# Patient Record
Sex: MALE | Race: BLACK OR AFRICAN AMERICAN | NOT HISPANIC OR LATINO | Employment: FULL TIME | URBAN - METROPOLITAN AREA
[De-identification: names, ages, dates, MRNs, and addresses within clinical notes are randomized per-mention and may not be internally consistent; named-entity substitution may affect disease eponyms.]

---

## 2017-01-03 ENCOUNTER — OFFICE VISIT (OUTPATIENT)
Dept: URGENT CARE | Facility: CLINIC | Age: 32
End: 2017-01-03
Payer: COMMERCIAL

## 2017-01-03 VITALS
SYSTOLIC BLOOD PRESSURE: 128 MMHG | WEIGHT: 202 LBS | DIASTOLIC BLOOD PRESSURE: 88 MMHG | HEIGHT: 69 IN | HEART RATE: 86 BPM | TEMPERATURE: 99 F | OXYGEN SATURATION: 100 % | BODY MASS INDEX: 29.92 KG/M2

## 2017-01-03 DIAGNOSIS — J06.9 URI WITH COUGH AND CONGESTION: ICD-10-CM

## 2017-01-03 DIAGNOSIS — J40 BRONCHITIS: Primary | ICD-10-CM

## 2017-01-03 PROCEDURE — 99214 OFFICE O/P EST MOD 30 MIN: CPT | Performed by: PHYSICIAN ASSISTANT

## 2017-01-03 RX ORDER — PROMETHAZINE HYDROCHLORIDE AND CODEINE PHOSPHATE 6.25; 1 MG/5ML; MG/5ML
5 SYRUP ORAL 4 TIMES DAILY PRN
Qty: 240 ML | Refills: 0 | Status: SHIPPED | OUTPATIENT
Start: 2017-01-03 | End: 2017-01-17

## 2017-01-03 RX ORDER — AZITHROMYCIN 250 MG/1
TABLET, FILM COATED ORAL
Qty: 6 TAB | Refills: 0 | Status: SHIPPED | OUTPATIENT
Start: 2017-01-03 | End: 2017-05-30

## 2017-01-03 ASSESSMENT — ENCOUNTER SYMPTOMS: COUGH: 1

## 2017-01-03 ASSESSMENT — COPD QUESTIONNAIRES: COPD: 0

## 2017-01-03 NOTE — PATIENT INSTRUCTIONS
Acute Bronchitis  Bronchitis is inflammation of the airways that extend from the windpipe into the lungs (bronchi). The inflammation often causes mucus to develop. This leads to a cough, which is the most common symptom of bronchitis.   In acute bronchitis, the condition usually develops suddenly and goes away over time, usually in a couple weeks. Smoking, allergies, and asthma can make bronchitis worse. Repeated episodes of bronchitis may cause further lung problems.   CAUSES  Acute bronchitis is most often caused by the same virus that causes a cold. The virus can spread from person to person (contagious) through coughing, sneezing, and touching contaminated objects.  SIGNS AND SYMPTOMS   · Cough.    · Fever.    · Coughing up mucus.    · Body aches.    · Chest congestion.    · Chills.    · Shortness of breath.    · Sore throat.    DIAGNOSIS   Acute bronchitis is usually diagnosed through a physical exam. Your health care provider will also ask you questions about your medical history. Tests, such as chest X-rays, are sometimes done to rule out other conditions.   TREATMENT   Acute bronchitis usually goes away in a couple weeks. Oftentimes, no medical treatment is necessary. Medicines are sometimes given for relief of fever or cough. Antibiotic medicines are usually not needed but may be prescribed in certain situations. In some cases, an inhaler may be recommended to help reduce shortness of breath and control the cough. A cool mist vaporizer may also be used to help thin bronchial secretions and make it easier to clear the chest.   HOME CARE INSTRUCTIONS  · Get plenty of rest.    · Drink enough fluids to keep your urine clear or pale yellow (unless you have a medical condition that requires fluid restriction). Increasing fluids may help thin your respiratory secretions (sputum) and reduce chest congestion, and it will prevent dehydration.    · Take medicines only as directed by your health care provider.  · If  you were prescribed an antibiotic medicine, finish it all even if you start to feel better.  · Avoid smoking and secondhand smoke. Exposure to cigarette smoke or irritating chemicals will make bronchitis worse. If you are a smoker, consider using nicotine gum or skin patches to help control withdrawal symptoms. Quitting smoking will help your lungs heal faster.    · Reduce the chances of another bout of acute bronchitis by washing your hands frequently, avoiding people with cold symptoms, and trying not to touch your hands to your mouth, nose, or eyes.    · Keep all follow-up visits as directed by your health care provider.    SEEK MEDICAL CARE IF:  Your symptoms do not improve after 1 week of treatment.   SEEK IMMEDIATE MEDICAL CARE IF:  · You develop an increased fever or chills.    · You have chest pain.    · You have severe shortness of breath.  · You have bloody sputum.    · You develop dehydration.  · You faint or repeatedly feel like you are going to pass out.  · You develop repeated vomiting.  · You develop a severe headache.  MAKE SURE YOU:   · Understand these instructions.  · Will watch your condition.  · Will get help right away if you are not doing well or get worse.     This information is not intended to replace advice given to you by your health care provider. Make sure you discuss any questions you have with your health care provider.     Document Released: 01/25/2006 Document Revised: 01/08/2016 Document Reviewed: 06/10/2014  [employer] Interactive Patient Education ©2016 [employer]

## 2017-01-03 NOTE — PROGRESS NOTES
Subjective:      PT is a 31 y.o. male who presents with Cough            Cough  This is a new problem. The current episode started in the past 7 days. The problem has been gradually worsening. The problem occurs constantly. The cough is productive of purulent sputum. The symptoms are aggravated by cold air, exercise and lying down. He has tried OTC cough suppressant for the symptoms. The treatment provided no relief. There is no history of asthma, bronchiectasis, COPD or emphysema.   PT presents to  clinic today complaining of sore throat, fevers, chills, watery eyes, pressure in ears, cough, fatigue, runny nose, wheezing and SOB. PT denies CP, NVD, abdominal pain, joint pain. PT states these symptoms began around 7 days ago and that the pt's family has been sick on and off for the last week. Pt has not taken any medications for this condition. PT states the pain is a 6/10 with coughing, aching in nature and worse at night.  The pt's medication list, problem list, and allergies have been evaluated and reviewed during today's visit.    PMH:  Past Medical History   Diagnosis Date   • Headache(784.0)    • Hypertension    • Hyperlipemia        PSH:  Negative per pt.      Fam Hx:  Father with hx of HTN     Family Status   Relation Status Death Age   • Mother Alive    • Father Alive        Soc HX:  Social History     Social History   • Marital Status: Single     Spouse Name: N/A   • Number of Children: N/A   • Years of Education: N/A     Occupational History   • Not on file.     Social History Main Topics   • Smoking status: Never Smoker    • Smokeless tobacco: Never Used   • Alcohol Use: 0.0 oz/week     0 Standard drinks or equivalent per week      Comment: occassionally/rarely   • Drug Use: No   • Sexual Activity:     Partners: Female     Other Topics Concern   • Not on file     Social History Narrative         Medications:    Current outpatient prescriptions:   •  azithromycin (ZITHROMAX) 250 MG Tab, Z-pack: use as  "directed, Disp: 6 Tab, Rfl: 0  •  promethazine-codeine (PHENERGAN-CODEINE) 6.25-10 MG/5ML Syrup, Take 5 mL by mouth 4 times a day as needed for up to 14 days., Disp: 240 mL, Rfl: 0  •  tizanidine (ZANAFLEX) 4 MG Tab, TAKE 1 TAB BY MOUTH 2 TIMES A DAY., Disp: 60 Tab, Rfl: 0  •  alprazolam (XANAX) 0.5 MG Tab, Take 1 Tab by mouth 3 times a day as needed for Sleep or Anxiety., Disp: 90 Tab, Rfl: 02  •  citalopram (CELEXA) 20 MG Tab, Take 1 Tab by mouth every day., Disp: 30 Tab, Rfl: 3  •  cetirizine (ZYRTEC) 10 MG Tab, Take 10 mg by mouth every day., Disp: , Rfl:       Allergies:  Review of patient's allergies indicates no known allergies.        Review of Systems   Respiratory: Positive for cough.    Constitutional: Positive for chills and malaise/fatigue. Negative for fever and diaphoresis.   HENT: Positive for congestion, ear pain and sore throat. Negative for ear discharge, hearing loss, nosebleeds and tinnitus.    Eyes: Negative for blurred vision, double vision and photophobia.   Respiratory: Positive for cough, sputum production, shortness of breath and wheezing. Negative for hemoptysis.    Cardiovascular: Negative for chest pain and palpitations.   Gastrointestinal: Negative for nausea, vomiting, abdominal pain, diarrhea and constipation.   Genitourinary: Negative for dysuria and flank pain.   Musculoskeletal: Negative for joint pain and myalgias.   Skin: Negative for itching and rash.   Neurological: Positive for headaches. Negative for dizziness, tingling and weakness.   Endo/Heme/Allergies: Does not bruise/bleed easily.   Psychiatric/Behavioral: Negative for depression. The patient is not nervous/anxious.               Objective:     /88 mmHg  Pulse 86  Temp(Src) 37.2 °C (99 °F)  Ht 1.753 m (5' 9.02\")  Wt 91.627 kg (202 lb)  BMI 29.82 kg/m2  SpO2 100%     Physical Exam      Physical Exam   Constitutional: PT is oriented to person, place, and time. PT appears well-developed and well-nourished. No " distress.   HENT:   Head: Normocephalic and atraumatic.   Right Ear: Hearing, tympanic membrane, external ear and ear canal normal.   Left Ear: Hearing, tympanic membrane, external ear and ear canal normal.   Nose: Mucosal edema, rhinorrhea and sinus tenderness present. Right sinus exhibits frontal sinus tenderness. Left sinus exhibits frontal sinus tenderness.   Mouth/Throat: Uvula is midline. Mucous membranes are pale. Posterior oropharyngeal edema and posterior oropharyngeal erythema present. No oropharyngeal exudate.   Eyes: Conjunctivae normal and EOM are normal. Pupils are equal, round, and reactive to light. Right eye exhibits no discharge. Left eye exhibits no discharge.   Neck: Normal range of motion. Neck supple. No thyromegaly present.   Cardiovascular: Normal rate, regular rhythm, normal heart sounds and intact distal pulses.  Exam reveals no gallop and no friction rub.    No murmur heard.  Pulmonary/Chest: Effort normal. No respiratory distress. PT has wheezes. PT has no rales. PT exhibits tenderness.   Abdominal: Soft. Bowel sounds are normal. PT exhibits no distension and no mass. There is no tenderness. There is no rebound and no guarding.   Musculoskeletal: Normal range of motion. PT exhibits no edema and no tenderness.   Lymphadenopathy:     PT has no cervical adenopathy.   Neurological: Pt is alert and oriented to person, place, and time. Pt has normal reflexes. No cranial nerve deficit.   Skin: Skin is warm and dry. No rash noted. No erythema.   Psychiatric: PT has a normal mood and affect. Pt behavior is normal. Judgment and thought content normal.          Assessment/Plan:     1. Bronchitis    - azithromycin (ZITHROMAX) 250 MG Tab; Z-pack: use as directed  Dispense: 6 Tab; Refill: 0    2. URI with cough and congestion    - promethazine-codeine (PHENERGAN-CODEINE) 6.25-10 MG/5ML Syrup; Take 5 mL by mouth 4 times a day as needed for up to 14 days.  Dispense: 240 mL; Refill: 0    Rest, fluids  encouraged.  OTC decongestant for congestion/cough  AVS with medical info given.  Pt was in full understanding and agreement with the plan.  Follow-up as needed if symptoms worsen or fail to improve.

## 2017-01-03 NOTE — MR AVS SNAPSHOT
"        Lonnie Ennis   1/3/2017 9:30 AM   Office Visit   MRN: [id number]    Department:  [facility]   Dept Phone:  [phone]    Description:  Male : 1985   Provider:  Micah Sinclair PA-C           Reason for Visit     Cough x 1 week, hurts to cough       Allergies as of 1/3/2017     No Known Allergies      You were diagnosed with     Bronchitis   [[id number]]  -  Primary     URI with cough and congestion   [[id number]]         Vital Signs     Blood Pressure Pulse Temperature Height Weight Body Mass Index    128/88 mmHg 86 37.2 °C (99 °F) 1.753 m (5' 9.02\") 91.627 kg (202 lb) 29.82 kg/m2    Oxygen Saturation Smoking Status                100% Never Smoker           Basic Information     Date Of Birth Sex Race Ethnicity Preferred Language    1985 Male Black or  Non- English      Problem List              ICD-10-CM Priority Class Noted - Resolved    Generalized headaches R51   2014 - Present    Hyperlipidemia E78.5   2014 - Present    Seasonal allergies J30.2   10/6/2014 - Present    Elevated BP I10   2015 - Present    Nasal congestion R09.81   2015 - Present    Anxiety and depression F41.9, F32.9   2016 - Present      Health Maintenance        Date Due Completion Dates    IMM DTaP/Tdap/Td Vaccine (1 - Tdap) 2005 ---    IMM INFLUENZA (1) 2016 ---            Current Immunizations     No immunizations on file.      Below and/or attached are the medications your provider expects you to take. Review all of your home medications and newly ordered medications with your provider and/or pharmacist. Follow medication instructions as directed by your provider and/or pharmacist. Please keep your medication list with you and share with your provider. Update the information when medications are discontinued, doses are changed, or new medications (including over-the-counter products) are added; and carry medication information at all times in the event of " emergency situations     Allergies:  No Known Allergies          Medications  Valid as of: January 03, 2017 -  9:47 AM    Generic Name Brand Name Tablet Size Instructions for use    ALPRAZolam (Tab) XANAX 0.5 MG Take 1 Tab by mouth 3 times a day as needed for Sleep or Anxiety.        Azithromycin (Tab) ZITHROMAX 250 MG Z-pack: use as directed        Cetirizine HCl (Tab) ZYRTEC 10 MG Take 10 mg by mouth every day.        Citalopram Hydrobromide (Tab) CELEXA 20 MG Take 1 Tab by mouth every day.        Promethazine-Codeine (Syrup) PHENERGAN-CODEINE 6.25-10 MG/5ML Take 5 mL by mouth 4 times a day as needed for up to 14 days.        TiZANidine HCl (Tab) ZANAFLEX 4 MG TAKE 1 TAB BY MOUTH 2 TIMES A DAY.        .                 Medicines prescribed today were sent to:     [id number] - [place], [place] - [address]    [address] [place] [address]    Phone: [phone] Fax: [phone]    Open 24 Hours?: No      Medication refill instructions:       If your prescription bottle indicates you have medication refills left, it is not necessary to call your provider’s office. Please contact your pharmacy and they will refill your medication.    If your prescription bottle indicates you do not have any refills left, you may request refills at any time through one of the following ways: The online [employer] system (except Urgent Care), by calling your provider’s office, or by asking your pharmacy to contact your provider’s office with a refill request. Medication refills are processed only during regular business hours and may not be available until the next business day. Your provider may request additional information or to have a follow-up visit with you prior to refilling your medication.   *Please Note: Medication refills are assigned a new Rx number when refilled electronically. Your pharmacy may indicate that no refills were authorized even though a new prescription for the same medication is available at the  pharmacy. Please request the medicine by name with the pharmacy before contacting your provider for a refill.        Instructions    Acute Bronchitis  Bronchitis is inflammation of the airways that extend from the windpipe into the lungs (bronchi). The inflammation often causes mucus to develop. This leads to a cough, which is the most common symptom of bronchitis.   In acute bronchitis, the condition usually develops suddenly and goes away over time, usually in a couple weeks. Smoking, allergies, and asthma can make bronchitis worse. Repeated episodes of bronchitis may cause further lung problems.   CAUSES  Acute bronchitis is most often caused by the same virus that causes a cold. The virus can spread from person to person (contagious) through coughing, sneezing, and touching contaminated objects.  SIGNS AND SYMPTOMS   · Cough.    · Fever.    · Coughing up mucus.    · Body aches.    · Chest congestion.    · Chills.    · Shortness of breath.    · Sore throat.    DIAGNOSIS   Acute bronchitis is usually diagnosed through a physical exam. Your health care provider will also ask you questions about your medical history. Tests, such as chest X-rays, are sometimes done to rule out other conditions.   TREATMENT   Acute bronchitis usually goes away in a couple weeks. Oftentimes, no medical treatment is necessary. Medicines are sometimes given for relief of fever or cough. Antibiotic medicines are usually not needed but may be prescribed in certain situations. In some cases, an inhaler may be recommended to help reduce shortness of breath and control the cough. A cool mist vaporizer may also be used to help thin bronchial secretions and make it easier to clear the chest.   HOME CARE INSTRUCTIONS  · Get plenty of rest.    · Drink enough fluids to keep your urine clear or pale yellow (unless you have a medical condition that requires fluid restriction). Increasing fluids may help thin your respiratory secretions (sputum) and  reduce chest congestion, and it will prevent dehydration.    · Take medicines only as directed by your health care provider.  · If you were prescribed an antibiotic medicine, finish it all even if you start to feel better.  · Avoid smoking and secondhand smoke. Exposure to cigarette smoke or irritating chemicals will make bronchitis worse. If you are a smoker, consider using nicotine gum or skin patches to help control withdrawal symptoms. Quitting smoking will help your lungs heal faster.    · Reduce the chances of another bout of acute bronchitis by washing your hands frequently, avoiding people with cold symptoms, and trying not to touch your hands to your mouth, nose, or eyes.    · Keep all follow-up visits as directed by your health care provider.    SEEK MEDICAL CARE IF:  Your symptoms do not improve after 1 week of treatment.   SEEK IMMEDIATE MEDICAL CARE IF:  · You develop an increased fever or chills.    · You have chest pain.    · You have severe shortness of breath.  · You have bloody sputum.    · You develop dehydration.  · You faint or repeatedly feel like you are going to pass out.  · You develop repeated vomiting.  · You develop a severe headache.  MAKE SURE YOU:   · Understand these instructions.  · Will watch your condition.  · Will get help right away if you are not doing well or get worse.     This information is not intended to replace advice given to you by your health care provider. Make sure you discuss any questions you have with your health care provider.     Document Released: 01/25/2006 Document Revised: 01/08/2016 Document Reviewed: 06/10/2014  [employer] Interactive Patient Education ©2016 [employer]            [employer] Access Code: Activation code not generated  Current [employer] Status: Active

## 2017-05-30 ENCOUNTER — OFFICE VISIT (OUTPATIENT)
Dept: MEDICAL GROUP | Facility: MEDICAL CENTER | Age: 32
End: 2017-05-30
Payer: COMMERCIAL

## 2017-05-30 VITALS
WEIGHT: 212.3 LBS | HEART RATE: 70 BPM | SYSTOLIC BLOOD PRESSURE: 120 MMHG | DIASTOLIC BLOOD PRESSURE: 84 MMHG | OXYGEN SATURATION: 97 % | RESPIRATION RATE: 16 BRPM | BODY MASS INDEX: 31.44 KG/M2 | TEMPERATURE: 98.2 F | HEIGHT: 69 IN

## 2017-05-30 DIAGNOSIS — G44.209 MUSCLE TENSION HEADACHE: ICD-10-CM

## 2017-05-30 DIAGNOSIS — R51.9 GENERALIZED HEADACHES: ICD-10-CM

## 2017-05-30 DIAGNOSIS — E66.9 OBESITY (BMI 30-39.9): ICD-10-CM

## 2017-05-30 PROCEDURE — 99214 OFFICE O/P EST MOD 30 MIN: CPT | Performed by: PHYSICIAN ASSISTANT

## 2017-05-30 RX ORDER — METHOCARBAMOL 500 MG/1
500 TABLET, FILM COATED ORAL 3 TIMES DAILY
Qty: 90 TAB | Refills: 1 | Status: SHIPPED | OUTPATIENT
Start: 2017-05-30 | End: 2019-08-29

## 2017-05-30 NOTE — ASSESSMENT & PLAN NOTE
Positive association between headache and left shoulder/neck pain. Patient states affecting in the trapezius region. Patient states that positive stress in the job. Patient states positive pain in the left superior aspect of the shoulder/neck region. Patient states pain is worse with movement of his neck. Patient states he does notice that when he moves his neck he does have pain up to the left base of his skull. Patient states no numbness. Patient states in the passes tried Zanaflex 4 mg without any success. Patient presents to clinic today for further evaluation, treatment

## 2017-05-30 NOTE — PROGRESS NOTES
Chief Complaint   Patient presents with   • Neck Pain       HISTORY OF PRESENT ILLNESS: Patient is a 31 y.o. male established patient who presents today to discuss HA and muscle tension    Generalized headaches  Positive association between headache and left shoulder/neck pain. Patient states affecting in the trapezius region. Patient states that positive stress in the job. Patient states positive pain in the left superior aspect of the shoulder/neck region. Patient states pain is worse with movement of his neck. Patient states he does notice that when he moves his neck he does have pain up to the left base of his skull. Patient states no numbness. Patient states in the passes tried Zanaflex 4 mg without any success. Patient presents to clinic today for further evaluation, treatment     Patient Active Problem List    Diagnosis Date Noted   • Obesity (BMI 30-39.9) 05/30/2017   • Anxiety and depression 08/08/2016   • Elevated BP 07/31/2015   • Nasal congestion 07/31/2015   • Seasonal allergies 10/06/2014   • Hyperlipidemia 07/01/2014   • Generalized headaches 04/18/2014     Allergies:Review of patient's allergies indicates no known allergies.    Current Outpatient Prescriptions   Medication Sig Dispense Refill   • methocarbamol (ROBAXIN) 500 MG Tab Take 1 Tab by mouth 3 times a day. 90 Tab 01   • alprazolam (XANAX) 0.5 MG Tab TAKE 1 TABLET BY MOUTH 3 TIMES A DAY AS NEEDED FOR SLEEP OR ANXIETY 90 Tab 2   • citalopram (CELEXA) 20 MG Tab Take 1 Tab by mouth every day. 30 Tab 3   • cetirizine (ZYRTEC) 10 MG Tab Take 10 mg by mouth every day.       No current facility-administered medications for this visit.     Social History   Substance Use Topics   • Smoking status: Never Smoker    • Smokeless tobacco: Never Used   • Alcohol Use: 0.0 oz/week     0 Standard drinks or equivalent per week      Comment: occassionally/rarely     Family Status   Relation Status Death Age   • Mother Alive    • Father Alive    No family history  "on file.    Review of Systems:   Constitutional: Negative for fever, chills, weight loss and malaise/fatigue.   HENT: Negative for ear pain, nosebleeds, congestion, sore throat and positive left sided neck pain.    Eyes: Negative for blurred vision.   Respiratory: Negative for cough, sputum production, shortness of breath and wheezing.    Cardiovascular: Negative for chest pain, palpitations, orthopnea and leg swelling.   Gastrointestinal: Negative for heartburn, nausea, vomiting and abdominal pain.   Genitourinary: Negative for dysuria, urgency and frequency.   Musculoskeletal: Negative for myalgias, back pain and joint pain.   Skin: Negative for rash and itching.   Neurological: Negative for dizziness, tingling, tremors, sensory change, focal weakness and positive headaches.   Endo/Heme/Allergies: Does not bruise/bleed easily.   Psychiatric/Behavioral: Negative for depression, suicidal ideas and memory loss.  The patient is not nervous/anxious and does not have insomnia.    All other systems reviewed and are negative except as in HPI.    Exam:  Blood pressure 120/84, pulse 70, temperature 36.8 °C (98.2 °F), resp. rate 16, height 1.753 m (5' 9.02\"), weight 96.3 kg (212 lb 4.9 oz), SpO2 97 %.  General:  Well nourished, well developed male in NAD  Head: is grossly normal.  Neck: Supple without JVD or bruit. Thyroid is not enlarged. Positive tenderness to palpation noted patient's posterior occipital region on the left side with extension down the medial aspect of the trapezius muscle as well as over the superior ridge of the trapezius muscle. Patient also has some mild tenderness over the body of the trapezius  Pulmonary: Clear to ausculation. Normal effort. No rales, ronchi, or wheezing.  Cardiovascular: Regular rate and rhythm without murmur. Carotid and radial pulses are intact and equal bilaterally.  Extremities: no clubbing, cyanosis, or edema.    Medical decision-making and discussion: In discussion with " patient regarding various treatments we have discontinued Zanaflex as it is not effective. We discussed berries other muscle relaxers and at this present time to try Robaxin 500 mg 3 times a day when necessary spasm/pain/headache. Discussed with patient also possibility of discontinuing alprazolam and place patient on diazepam for muscle spasms and also for anxiety, but as this is longer acting we're going to hold off on this and see how patient reacts to Robaxin first. Patient agrees with plan. Discussed with patient warm compresses    Please note that this dictation was created using voice recognition software. I have made every reasonable attempt to correct obvious errors, but I expect that there are errors of grammar and possibly content that I did not discover before finalizing the note.    Assessment/Plan:  1. Obesity (BMI 30-39.9)  Patient identified as having weight management issue.  Appropriate orders and counseling given.   2. Generalized headaches     3. Muscle tension headache  methocarbamol (ROBAXIN) 500 MG Tab

## 2017-06-14 RX ORDER — DIAZEPAM 5 MG/1
5 TABLET ORAL EVERY 8 HOURS PRN
Qty: 90 TAB | Refills: 0 | Status: SHIPPED | OUTPATIENT
Start: 2017-06-14 | End: 2019-08-29

## 2017-06-14 RX ORDER — CITALOPRAM 20 MG/1
TABLET ORAL
Qty: 30 TAB | Refills: 2 | Status: SHIPPED | OUTPATIENT
Start: 2017-06-14 | End: 2019-08-29

## 2017-06-14 NOTE — TELEPHONE ENCOUNTER
Was the patient seen in the last year in this department? Yes     Does patient have an active prescription for medications requested? Yes     Received Request Via: Pharmacy     Last office visit: 05/30/2017  Last labs: 12/07/2016

## 2017-11-18 ENCOUNTER — OFFICE VISIT (OUTPATIENT)
Dept: URGENT CARE | Facility: CLINIC | Age: 32
End: 2017-11-18
Payer: COMMERCIAL

## 2017-11-18 VITALS
HEART RATE: 68 BPM | DIASTOLIC BLOOD PRESSURE: 88 MMHG | SYSTOLIC BLOOD PRESSURE: 138 MMHG | HEIGHT: 70 IN | OXYGEN SATURATION: 99 % | TEMPERATURE: 99.2 F | RESPIRATION RATE: 18 BRPM | WEIGHT: 211.2 LBS | BODY MASS INDEX: 30.24 KG/M2

## 2017-11-18 DIAGNOSIS — J01.00 ACUTE NON-RECURRENT MAXILLARY SINUSITIS: ICD-10-CM

## 2017-11-18 PROCEDURE — 99214 OFFICE O/P EST MOD 30 MIN: CPT | Performed by: PHYSICIAN ASSISTANT

## 2017-11-18 RX ORDER — AMOXICILLIN AND CLAVULANATE POTASSIUM 875; 125 MG/1; MG/1
1 TABLET, FILM COATED ORAL 2 TIMES DAILY
Qty: 14 TAB | Refills: 0 | Status: SHIPPED | OUTPATIENT
Start: 2017-11-18 | End: 2017-11-25

## 2017-11-18 RX ORDER — CODEINE PHOSPHATE AND GUAIFENESIN 10; 100 MG/5ML; MG/5ML
5 SOLUTION ORAL EVERY 4 HOURS PRN
Qty: 120 ML | Refills: 0 | Status: SHIPPED | OUTPATIENT
Start: 2017-11-18 | End: 2019-08-29

## 2017-11-18 ASSESSMENT — ENCOUNTER SYMPTOMS
CHILLS: 0
FEVER: 0
COUGH: 0
EYE DISCHARGE: 0
HEADACHES: 1
SORE THROAT: 1
EYE PAIN: 0
EYE REDNESS: 0
PALPITATIONS: 0
SHORTNESS OF BREATH: 0
SINUS PRESSURE: 1
WHEEZING: 0
DIZZINESS: 1

## 2017-11-18 NOTE — PATIENT INSTRUCTIONS

## 2017-11-18 NOTE — PROGRESS NOTES
Subjective:      Lonnie Ennis is a 32 y.o. male who presents with Sinus Problem (chest congestion and head congestion, cough, headache x 6 days )            Sinus Problem   This is a new problem. The current episode started 1 to 4 weeks ago. The problem has been gradually worsening since onset. Associated symptoms include congestion, ear pain, headaches, sinus pressure and a sore throat. Pertinent negatives include no chills, coughing or shortness of breath. Past treatments include oral decongestants. The treatment provided no relief.       Review of Systems   Constitutional: Positive for malaise/fatigue. Negative for chills and fever.   HENT: Positive for congestion, ear pain, sinus pressure and sore throat.    Eyes: Negative for pain, discharge and redness.   Respiratory: Negative for cough, shortness of breath and wheezing.    Cardiovascular: Negative for chest pain and palpitations.   Neurological: Positive for dizziness and headaches.   All other systems reviewed and are negative.    PMH:  has a past medical history of Headache(784.0); Hyperlipemia; and Hypertension.  MEDS:   Current Outpatient Prescriptions:   •  guaifenesin-codeine (CHERATUSSIN AC) Solution oral solution, Take 5 mL by mouth every four hours as needed for Cough., Disp: 120 mL, Rfl: 0  •  amoxicillin-clavulanate (AUGMENTIN) 875-125 MG Tab, Take 1 Tab by mouth 2 times a day for 7 days., Disp: 14 Tab, Rfl: 0  •  diazepam (VALIUM) 5 MG Tab, Take 1 Tab by mouth every 8 hours as needed for Anxiety (muscle tension)., Disp: 90 Tab, Rfl: 0  •  citalopram (CELEXA) 20 MG Tab, TAKE 1 TAB BY MOUTH EVERY DAY., Disp: 30 Tab, Rfl: 2  •  methocarbamol (ROBAXIN) 500 MG Tab, Take 1 Tab by mouth 3 times a day., Disp: 90 Tab, Rfl: 01  •  cetirizine (ZYRTEC) 10 MG Tab, Take 10 mg by mouth every day., Disp: , Rfl:   ALLERGIES: No Known Allergies  SURGHX: History reviewed. No pertinent surgical history.  SOCHX:  reports that he has never smoked. He has never used  "smokeless tobacco. He reports that he drinks alcohol. He reports that he does not use drugs.  FH: Family history was reviewed, no pertinent findings to report  Medications, Allergies, and current problem list reviewed today in Epic     Objective:     /88   Pulse 68   Temp 37.3 °C (99.2 °F)   Resp 18   Ht 1.778 m (5' 10\")   Wt 95.8 kg (211 lb 3.2 oz)   SpO2 99%   BMI 30.30 kg/m²      Physical Exam   Constitutional: He is oriented to person, place, and time. Vital signs are normal. He appears well-developed and well-nourished.   HENT:   Head: Normocephalic and atraumatic.   Right Ear: Hearing, tympanic membrane, external ear and ear canal normal.   Left Ear: Hearing, tympanic membrane, external ear and ear canal normal.   Nose: Mucosal edema and rhinorrhea present. No sinus tenderness. No epistaxis. Right sinus exhibits maxillary sinus tenderness. Left sinus exhibits maxillary sinus tenderness.   Mouth/Throat: Uvula is midline, oropharynx is clear and moist and mucous membranes are normal.   Neck: Normal range of motion. Neck supple.   Cardiovascular: Normal rate, regular rhythm, normal heart sounds and intact distal pulses.    Pulmonary/Chest: Effort normal and breath sounds normal.   Neurological: He is alert and oriented to person, place, and time.   Skin: Skin is warm and dry.   Psychiatric: He has a normal mood and affect. His behavior is normal.   Vitals reviewed.              Assessment/Plan:     1. Acute non-recurrent maxillary sinusitis    - guaifenesin-codeine (CHERATUSSIN AC) Solution oral solution; Take 5 mL by mouth every four hours as needed for Cough.  Dispense: 120 mL; Refill: 0  - amoxicillin-clavulanate (AUGMENTIN) 875-125 MG Tab; Take 1 Tab by mouth 2 times a day for 7 days.  Dispense: 14 Tab; Refill: 0    Differential diagnosis, natural history, supportive care, and indications for immediate follow-up discussed at length.   Follow-up with primary care provider within 4-5 days, " emergency room precautions discussed.  Patient and/or family appears understanding of information.

## 2019-07-16 ENCOUNTER — TELEPHONE (OUTPATIENT)
Dept: SCHEDULING | Facility: IMAGING CENTER | Age: 34
End: 2019-07-16

## 2019-08-29 ENCOUNTER — OFFICE VISIT (OUTPATIENT)
Dept: MEDICAL GROUP | Facility: LAB | Age: 34
End: 2019-08-29
Payer: COMMERCIAL

## 2019-08-29 VITALS
TEMPERATURE: 98 F | WEIGHT: 212.6 LBS | HEART RATE: 75 BPM | SYSTOLIC BLOOD PRESSURE: 128 MMHG | OXYGEN SATURATION: 97 % | HEIGHT: 70 IN | DIASTOLIC BLOOD PRESSURE: 72 MMHG | BODY MASS INDEX: 30.43 KG/M2

## 2019-08-29 DIAGNOSIS — F41.9 ANXIETY AND DEPRESSION: ICD-10-CM

## 2019-08-29 DIAGNOSIS — F32.A ANXIETY AND DEPRESSION: ICD-10-CM

## 2019-08-29 DIAGNOSIS — R03.0 ELEVATED BLOOD PRESSURE READING: ICD-10-CM

## 2019-08-29 DIAGNOSIS — E78.5 HYPERLIPIDEMIA, UNSPECIFIED HYPERLIPIDEMIA TYPE: ICD-10-CM

## 2019-08-29 PROCEDURE — 99202 OFFICE O/P NEW SF 15 MIN: CPT | Performed by: FAMILY MEDICINE

## 2019-08-29 RX ORDER — CITALOPRAM 20 MG/1
20 TABLET ORAL
Qty: 30 TAB | Refills: 2 | Status: SHIPPED | OUTPATIENT
Start: 2019-08-29 | End: 2019-11-21 | Stop reason: SDUPTHER

## 2019-08-29 RX ORDER — TRAZODONE HYDROCHLORIDE 50 MG/1
50 TABLET ORAL NIGHTLY PRN
Qty: 30 TAB | Refills: 3 | Status: SHIPPED | OUTPATIENT
Start: 2019-08-29 | End: 2021-06-25

## 2019-08-29 SDOH — HEALTH STABILITY: MENTAL HEALTH: HOW OFTEN DO YOU HAVE A DRINK CONTAINING ALCOHOL?: NEVER

## 2019-08-29 ASSESSMENT — PATIENT HEALTH QUESTIONNAIRE - PHQ9
4. FEELING TIRED OR HAVING LITTLE ENERGY: NEARLY EVERY DAY
9. THOUGHTS THAT YOU WOULD BE BETTER OFF DEAD, OR OF HURTING YOURSELF: NOT AT ALL
8. MOVING OR SPEAKING SO SLOWLY THAT OTHER PEOPLE COULD HAVE NOTICED. OR THE OPPOSITE, BEING SO FIGETY OR RESTLESS THAT YOU HAVE BEEN MOVING AROUND A LOT MORE THAN USUAL: NOT AT ALL
5. POOR APPETITE OR OVEREATING: MORE THAN HALF THE DAYS
SUM OF ALL RESPONSES TO PHQ QUESTIONS 1-9: 16
3. TROUBLE FALLING OR STAYING ASLEEP OR SLEEPING TOO MUCH: NEARLY EVERY DAY
7. TROUBLE CONCENTRATING ON THINGS, SUCH AS READING THE NEWSPAPER OR WATCHING TELEVISION: NEARLY EVERY DAY
6. FEELING BAD ABOUT YOURSELF - OR THAT YOU ARE A FAILURE OR HAVE LET YOURSELF OR YOUR FAMILY DOWN: NOT AL ALL
2. FEELING DOWN, DEPRESSED, IRRITABLE, OR HOPELESS: MORE THAN HALF THE DAYS
1. LITTLE INTEREST OR PLEASURE IN DOING THINGS: NEARLY EVERY DAY
SUM OF ALL RESPONSES TO PHQ9 QUESTIONS 1 AND 2: 5

## 2019-08-29 NOTE — PROGRESS NOTES
Subjective:     CC: [facility]    HPI:   Lonnie presents today with     Anxiety/Depression:  This is a chronic unstable issue, new to me.  Patient has a history of anxiety depression as well as obsessive-compulsive disorder.  He was followed previously by primary care however because of insurance changes he could not continue to follow-up.  He was previously taking Valium and Celexa which worked well for him but had to stop because of insurance issues.  His PHQ 9 today is 16 and he denies any suicidal ideation, he states that because of his anxiety awake at night.    Elevated BP:  This is an acute issue, patient states he was seen in urgent care and he had an elevated blood pressure with a systolic of 146.  He is concerned that he has high blood pressure.  He denies any symptoms with the exception on-and-off headaches.    HLD:   This is a chronic unstable issue, new to me.  Patient was previously on Lipitor for elevated blood pressures.  He has not checked in some time however he has stopped medications because of insurance issues.  Denies any chest pain or signs of ischemia.    Past Medical History:   Diagnosis Date   • Anxiety    • Depression    • Headache(784.0)    • Hyperlipemia    • Hypertension        Social History     Tobacco Use   • Smoking status: Never Smoker   • Smokeless tobacco: Never Used   Substance Use Topics   • Alcohol use: Not Currently     Alcohol/week: 0.0 oz     Frequency: Never   • Drug use: No       Current Outpatient Medications Ordered in Epic   Medication Sig Dispense Refill   • citalopram (CELEXA) 20 MG Tab Take 1 Tab by mouth every day. TAKE 1 TAB BY MOUTH EVERY DAY. 30 Tab 2   • traZODone (DESYREL) 50 MG Tab Take 1 Tab by mouth at bedtime as needed for Sleep. 30 Tab 3   • cetirizine (ZYRTEC) 10 MG Tab Take 10 mg by mouth every day.       No current [facility]-ordered facility-administered medications on file.        Allergies:  Patient has no known allergies.    ROS:  Gen: no fevers/chill, no  "changes in weight  Eyes: no changes in vision  ENT: no sore throat, no hearing loss, no bloody nose  Pulm: no sob, no cough  CV: no chest pain, no palpitations  GI: no nausea/vomiting, no diarrhea  : no dysuria  MSk: no myalgias  Skin: no rash  Neuro: no headaches, no numbness/tingling  Heme/Lymph: no easy bruising      Objective:       Exam:  /72 (BP Location: Left arm, Patient Position: Sitting)   Pulse 75   Temp 36.7 °C (98 °F) (Temporal)   Ht 1.778 m (5' 10\")   Wt 96.4 kg (212 lb 9.6 oz)   SpO2 97%   BMI 30.50 kg/m²  Body mass index is 30.5 kg/m².    Gen: Alert and oriented, No apparent distress.  Neck: Neck is supple without lymphadenopathy.  Lungs: Normal effort, CTA bilaterally, no wheezes, rhonchi, or rales  CV: Regular rate and rhythm. No murmurs, rubs, or gallops.       Ext: No clubbing, cyanosis, edema.      Assessment & Plan:     34 y.o. male with the following -     1. Elevated blood pressure reading  Follow-up 2-week logs of blood pressure.  In office blood pressure within normal limits.  Follow-up precautions discussed.    2. Hyperlipidemia, unspecified hyperlipidemia type  Follow-up labs.  Continue diet and exercise, will reevaluate labs and see if Lipitor uses necessary.  - HEMOGLOBIN A1C; Future  - CBC WITH DIFFERENTIAL; Future  - Comp Metabolic Panel; Future  - Lipid Profile; Future  - TSH WITH REFLEX TO FT4; Future    3. Anxiety and depression  Given the OCD and chronic depression and anxiety will refer to psychiatry at this time.  Can restart her Celexa and added trazodone for insomnia.  Discussed side effects, risk, benefits, and contraindications.  Follow-up 3 months.  - REFERRAL TO PSYCHIATRY        Please note that this dictation was created using voice recognition software. I have made every reasonable attempt to correct obvious errors, but I expect that there are errors of grammar and possibly content that I did not discover before finalizing the note.      "

## 2019-11-21 RX ORDER — CITALOPRAM 20 MG/1
TABLET ORAL
Qty: 30 TAB | Refills: 2 | Status: SHIPPED | OUTPATIENT
Start: 2019-11-21 | End: 2021-06-25

## 2019-12-02 ENCOUNTER — APPOINTMENT (OUTPATIENT)
Dept: MEDICAL GROUP | Facility: LAB | Age: 34
End: 2019-12-02
Payer: COMMERCIAL

## 2021-06-25 ENCOUNTER — OFFICE VISIT (OUTPATIENT)
Dept: MEDICAL GROUP | Facility: LAB | Age: 36
End: 2021-06-25
Payer: COMMERCIAL

## 2021-06-25 VITALS
DIASTOLIC BLOOD PRESSURE: 100 MMHG | HEIGHT: 69 IN | BODY MASS INDEX: 33.82 KG/M2 | HEART RATE: 78 BPM | RESPIRATION RATE: 18 BRPM | WEIGHT: 228.3 LBS | SYSTOLIC BLOOD PRESSURE: 132 MMHG | OXYGEN SATURATION: 97 % | TEMPERATURE: 98.1 F

## 2021-06-25 DIAGNOSIS — I10 ESSENTIAL HYPERTENSION: Primary | ICD-10-CM

## 2021-06-25 DIAGNOSIS — F41.9 ANXIETY: ICD-10-CM

## 2021-06-25 DIAGNOSIS — Z00.00 PREVENTATIVE HEALTH CARE: ICD-10-CM

## 2021-06-25 DIAGNOSIS — E78.5 HYPERLIPIDEMIA, UNSPECIFIED HYPERLIPIDEMIA TYPE: ICD-10-CM

## 2021-06-25 PROCEDURE — 99214 OFFICE O/P EST MOD 30 MIN: CPT | Performed by: PHYSICIAN ASSISTANT

## 2021-06-25 RX ORDER — AMLODIPINE BESYLATE 5 MG/1
5 TABLET ORAL DAILY
Qty: 30 TABLET | Refills: 0 | Status: SHIPPED | OUTPATIENT
Start: 2021-06-25 | End: 2021-07-17 | Stop reason: SDUPTHER

## 2021-06-25 RX ORDER — ESCITALOPRAM OXALATE 10 MG/1
10 TABLET ORAL DAILY
Qty: 90 TABLET | Refills: 1 | Status: SHIPPED | OUTPATIENT
Start: 2021-06-25 | End: 2021-09-01 | Stop reason: SDUPTHER

## 2021-06-25 RX ORDER — DIAZEPAM 2 MG/1
2 TABLET ORAL EVERY 12 HOURS PRN
Qty: 30 TABLET | Refills: 0 | Status: SHIPPED | OUTPATIENT
Start: 2021-06-25 | End: 2021-09-01 | Stop reason: SDUPTHER

## 2021-06-25 ASSESSMENT — PATIENT HEALTH QUESTIONNAIRE - PHQ9
4. FEELING TIRED OR HAVING LITTLE ENERGY: NOT AT ALL
8. MOVING OR SPEAKING SO SLOWLY THAT OTHER PEOPLE COULD HAVE NOTICED. OR THE OPPOSITE, BEING SO FIGETY OR RESTLESS THAT YOU HAVE BEEN MOVING AROUND A LOT MORE THAN USUAL: NOT AT ALL
1. LITTLE INTEREST OR PLEASURE IN DOING THINGS: NOT AT ALL
7. TROUBLE CONCENTRATING ON THINGS, SUCH AS READING THE NEWSPAPER OR WATCHING TELEVISION: NOT AT ALL
2. FEELING DOWN, DEPRESSED, IRRITABLE, OR HOPELESS: NOT AT ALL
5. POOR APPETITE OR OVEREATING: NOT AT ALL
6. FEELING BAD ABOUT YOURSELF - OR THAT YOU ARE A FAILURE OR HAVE LET YOURSELF OR YOUR FAMILY DOWN: NOT AL ALL
SUM OF ALL RESPONSES TO PHQ9 QUESTIONS 1 AND 2: 0
9. THOUGHTS THAT YOU WOULD BE BETTER OFF DEAD, OR OF HURTING YOURSELF: NOT AT ALL
3. TROUBLE FALLING OR STAYING ASLEEP OR SLEEPING TOO MUCH: NOT AT ALL
SUM OF ALL RESPONSES TO PHQ QUESTIONS 1-9: 0

## 2021-06-25 NOTE — ASSESSMENT & PLAN NOTE
New to me; chronic issue   Has used Lipitor 10mg in the past.   Due for repeat labs   Reports that he eats red meat every day - trying to cut back.

## 2021-06-25 NOTE — ASSESSMENT & PLAN NOTE
New to me; chronic  Previously using Celexa; has also tried Lexapro, Valium and Xanax.   Reports that Celexa causes an upset stomach and is interested in restart Lexapro.   Anxiety can be high at times and would also like to restart Valium.   Also reports a known history of OCD and is aware of his triggers.

## 2021-06-25 NOTE — PROGRESS NOTES
"Subjective:   Lonnie Ennis is a 35 y.o. male here today for BP check, labs and chronic anxiety     Hyperlipidemia  New to me; chronic issue   Has used Lipitor 10mg in the past.   Due for repeat labs   Reports that he eats red meat every day - trying to cut back.     Benign essential HTN  New to me; chronic  Has not used medication for this in the past.   Does not consume excessive amounts of sodium, though does eat a lot of red meat.   No cheat pain, sob  No visions changes.   Occasional headaches that he attributes to stressful job and upper back and neck tightness  No edema or leg swelling.     Anxiety and depression  New to me; chronic  Previously using Celexa; has also tried Lexapro, Valium and Xanax.   Reports that Celexa causes an upset stomach and is interested in restart Lexapro.   Anxiety can be high at times and would also like to restart Valium.   Also reports a known history of OCD and is aware of his triggers.        Current medicines (including changes today)  Current Outpatient Medications   Medication Sig Dispense Refill   • escitalopram (LEXAPRO) 10 MG Tab Take 1 tablet by mouth every day. 90 tablet 1   • diazePAM (VALIUM) 2 MG Tab Take 1 tablet by mouth every 12 hours as needed for Anxiety for up to 30 days. 30 tablet 0   • amLODIPine (NORVASC) 5 MG Tab Take 1 tablet by mouth every day. 30 tablet 0     No current facility-administered medications for this visit.     He  has a past medical history of Anxiety, Depression, Headache(784.0), Hyperlipemia, and Hypertension.    ROS   As per HPI  No chest pain, no shortness of breath, no abdominal pain       Objective:     /100 (BP Location: Left arm, Patient Position: Sitting, BP Cuff Size: Large adult)   Pulse 78   Temp 36.7 °C (98.1 °F) (Temporal)   Resp 18   Ht 1.753 m (5' 9\")   Wt 104 kg (228 lb 4.8 oz)   SpO2 97%  Body mass index is 33.71 kg/m².   Physical Exam:  Constitutional: Alert, no distress.  Skin: Warm, dry, good turgor, no " rashes in visible areas.  Eye: Equal, round, conjunctiva clear, lids normal.  Neck: Trachea midline, no masses, no thyromegaly. No cervical or supraclavicular lymphadenopathy  Respiratory: Unlabored respiratory effort, lungs clear to auscultation, no wheezes, no ronchi.  Cardiovascular: Normal S1, S2, no murmur, no edema.  Psych: Alert and oriented x3, normal affect and mood.        Assessment and Plan:   The following treatment plan was discussed    1. Essential hypertension  New to me; chronic  Will start amlodipine as written.   Pt was educated on use and SEs of medication.  Reduce sodium and red meat intake.   Will repeat BP/BP check in 2 weeks.   - amLODIPine (NORVASC) 5 MG Tab; Take 1 tablet by mouth every day.  Dispense: 30 tablet; Refill: 0    2. Hyperlipidemia, unspecified hyperlipidemia type  Due for repeat labs  - Lipid Profile; Future    3. Preventative health care  - CBC WITH DIFFERENTIAL; Future  - Comp Metabolic Panel; Future  - Lipid Profile; Future  - TSH WITH REFLEX TO FT4; Future  - HEMOGLOBIN A1C; Future    4. Anxiety  New to me; chronic  Pt would like to restart Lexparo as this did not cause GI side effects lie Celexa.   Also used Valium in the past for panic episodes.   Refills as written.   Pt was educated on use and SEs of medication.  Aware that Valium can be habit forming and can cause drowsiness. Not to be used with alcohol or any other sedating substance.   - escitalopram (LEXAPRO) 10 MG Tab; Take 1 tablet by mouth every day.  Dispense: 90 tablet; Refill: 1  - diazePAM (VALIUM) 2 MG Tab; Take 1 tablet by mouth every 12 hours as needed for Anxiety for up to 30 days.  Dispense: 30 tablet; Refill: 0    New to me; has been seen by [facility] w/in the past 3 years.     Followup: Return in about 2 weeks (around 7/9/2021) for BP check with me .       Laura Warren P.A.-C.  Supervising MD: Dr. Abdullahi Landers MD  06/25/21

## 2021-06-25 NOTE — ASSESSMENT & PLAN NOTE
New to me; chronic  Has not used medication for this in the past.   Does not consume excessive amounts of sodium, though does eat a lot of red meat.   No cheat pain, sob  No visions changes.   Occasional headaches that he attributes to stressful job and upper back and neck tightness  No edema or leg swelling.

## 2021-07-02 ENCOUNTER — HOSPITAL ENCOUNTER (OUTPATIENT)
Dept: LAB | Facility: MEDICAL CENTER | Age: 36
End: 2021-07-02
Attending: PHYSICIAN ASSISTANT
Payer: COMMERCIAL

## 2021-07-02 DIAGNOSIS — Z00.00 PREVENTATIVE HEALTH CARE: ICD-10-CM

## 2021-07-02 DIAGNOSIS — E78.5 HYPERLIPIDEMIA, UNSPECIFIED HYPERLIPIDEMIA TYPE: ICD-10-CM

## 2021-07-02 LAB
ALBUMIN SERPL BCP-MCNC: 4.8 G/DL (ref 3.2–4.9)
ALBUMIN/GLOB SERPL: 1.5 G/DL
ALP SERPL-CCNC: 49 U/L (ref 30–99)
ALT SERPL-CCNC: 23 U/L (ref 2–50)
ANION GAP SERPL CALC-SCNC: 12 MMOL/L (ref 7–16)
AST SERPL-CCNC: 31 U/L (ref 12–45)
BASOPHILS # BLD AUTO: 0.5 % (ref 0–1.8)
BASOPHILS # BLD: 0.02 K/UL (ref 0–0.12)
BILIRUB SERPL-MCNC: 0.7 MG/DL (ref 0.1–1.5)
BUN SERPL-MCNC: 13 MG/DL (ref 8–22)
CALCIUM SERPL-MCNC: 9.4 MG/DL (ref 8.5–10.5)
CHLORIDE SERPL-SCNC: 100 MMOL/L (ref 96–112)
CHOLEST SERPL-MCNC: 248 MG/DL (ref 100–199)
CO2 SERPL-SCNC: 25 MMOL/L (ref 20–33)
CREAT SERPL-MCNC: 1.18 MG/DL (ref 0.5–1.4)
EOSINOPHIL # BLD AUTO: 0.03 K/UL (ref 0–0.51)
EOSINOPHIL NFR BLD: 0.7 % (ref 0–6.9)
ERYTHROCYTE [DISTWIDTH] IN BLOOD BY AUTOMATED COUNT: 41.1 FL (ref 35.9–50)
EST. AVERAGE GLUCOSE BLD GHB EST-MCNC: 120 MG/DL
FASTING STATUS PATIENT QL REPORTED: NORMAL
GLOBULIN SER CALC-MCNC: 3.3 G/DL (ref 1.9–3.5)
GLUCOSE SERPL-MCNC: 84 MG/DL (ref 65–99)
HBA1C MFR BLD: 5.8 % (ref 4–5.6)
HCT VFR BLD AUTO: 49.2 % (ref 42–52)
HDLC SERPL-MCNC: 40 MG/DL
HGB BLD-MCNC: 16.3 G/DL (ref 14–18)
IMM GRANULOCYTES # BLD AUTO: 0.01 K/UL (ref 0–0.11)
IMM GRANULOCYTES NFR BLD AUTO: 0.2 % (ref 0–0.9)
LDLC SERPL CALC-MCNC: 190 MG/DL
LYMPHOCYTES # BLD AUTO: 1.86 K/UL (ref 1–4.8)
LYMPHOCYTES NFR BLD: 44.8 % (ref 22–41)
MCH RBC QN AUTO: 28.3 PG (ref 27–33)
MCHC RBC AUTO-ENTMCNC: 33.1 G/DL (ref 33.7–35.3)
MCV RBC AUTO: 85.4 FL (ref 81.4–97.8)
MONOCYTES # BLD AUTO: 0.32 K/UL (ref 0–0.85)
MONOCYTES NFR BLD AUTO: 7.7 % (ref 0–13.4)
NEUTROPHILS # BLD AUTO: 1.91 K/UL (ref 1.82–7.42)
NEUTROPHILS NFR BLD: 46.1 % (ref 44–72)
NRBC # BLD AUTO: 0 K/UL
NRBC BLD-RTO: 0 /100 WBC
PLATELET # BLD AUTO: 239 K/UL (ref 164–446)
PMV BLD AUTO: 10.5 FL (ref 9–12.9)
POTASSIUM SERPL-SCNC: 4.4 MMOL/L (ref 3.6–5.5)
PROT SERPL-MCNC: 8.1 G/DL (ref 6–8.2)
RBC # BLD AUTO: 5.76 M/UL (ref 4.7–6.1)
SODIUM SERPL-SCNC: 137 MMOL/L (ref 135–145)
TRIGL SERPL-MCNC: 88 MG/DL (ref 0–149)
TSH SERPL DL<=0.005 MIU/L-ACNC: 0.65 UIU/ML (ref 0.38–5.33)
WBC # BLD AUTO: 4.2 K/UL (ref 4.8–10.8)

## 2021-07-02 PROCEDURE — 80053 COMPREHEN METABOLIC PANEL: CPT

## 2021-07-02 PROCEDURE — 84443 ASSAY THYROID STIM HORMONE: CPT

## 2021-07-02 PROCEDURE — 83036 HEMOGLOBIN GLYCOSYLATED A1C: CPT

## 2021-07-02 PROCEDURE — 80061 LIPID PANEL: CPT

## 2021-07-02 PROCEDURE — 36415 COLL VENOUS BLD VENIPUNCTURE: CPT

## 2021-07-02 PROCEDURE — 85025 COMPLETE CBC W/AUTO DIFF WBC: CPT

## 2021-07-09 ENCOUNTER — OFFICE VISIT (OUTPATIENT)
Dept: MEDICAL GROUP | Facility: LAB | Age: 36
End: 2021-07-09
Payer: COMMERCIAL

## 2021-07-09 VITALS
HEIGHT: 69 IN | SYSTOLIC BLOOD PRESSURE: 124 MMHG | TEMPERATURE: 98.6 F | OXYGEN SATURATION: 97 % | HEART RATE: 69 BPM | BODY MASS INDEX: 33.27 KG/M2 | RESPIRATION RATE: 16 BRPM | DIASTOLIC BLOOD PRESSURE: 98 MMHG | WEIGHT: 224.6 LBS

## 2021-07-09 DIAGNOSIS — I10 BENIGN ESSENTIAL HTN: Primary | ICD-10-CM

## 2021-07-09 DIAGNOSIS — Z23 NEED FOR VACCINATION: ICD-10-CM

## 2021-07-09 PROCEDURE — 90471 IMMUNIZATION ADMIN: CPT | Performed by: PHYSICIAN ASSISTANT

## 2021-07-09 PROCEDURE — 99212 OFFICE O/P EST SF 10 MIN: CPT | Mod: 25 | Performed by: PHYSICIAN ASSISTANT

## 2021-07-09 PROCEDURE — 90715 TDAP VACCINE 7 YRS/> IM: CPT | Performed by: PHYSICIAN ASSISTANT

## 2021-07-09 ASSESSMENT — FIBROSIS 4 INDEX: FIB4 SCORE: 0.92

## 2021-07-09 NOTE — ASSESSMENT & PLAN NOTE
Follow up; pt was started on Amlodipine 5mg PO once daily at last visit.   Systolic blood pressure has improved.  Diastolic is stable.  Patient denies any chest pain shortness of breath no edema in the legs.  Working to improve his diet by decreasing the amount of red meat consumption.  Reducing sodium.  Increasing fresh fruits and vegetables.  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

## 2021-07-09 NOTE — LETTER
July 9, 2021    To Whom It May Concern:         This is confirmation that Lonnie Ennis attended his scheduled appointment with Laura Warren P.A.-C. on 7/09/21.         If you have any questions please do not hesitate to call me at the phone number listed below.    Sincerely,          Laura Warren P.A.-C.  [phone]

## 2021-07-09 NOTE — PROGRESS NOTES
"Subjective:   Lonnie Ennis is a 35 y.o. male here today for BP follow up     Benign essential HTN  Follow up; pt was started on Amlodipine 5mg PO once daily at last visit.   Systolic blood pressure has improved.  Diastolic is stable.  Patient denies any chest pain shortness of breath no edema in the legs.  Working to improve his diet by decreasing the amount of red meat consumption.  Reducing sodium.  Increasing fresh fruits and vegetables.  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       Current medicines (including changes today)  Current Outpatient Medications   Medication Sig Dispense Refill   • escitalopram (LEXAPRO) 10 MG Tab Take 1 tablet by mouth every day. 90 tablet 1   • diazePAM (VALIUM) 2 MG Tab Take 1 tablet by mouth every 12 hours as needed for Anxiety for up to 30 days. 30 tablet 0   • amLODIPine (NORVASC) 5 MG Tab Take 1 tablet by mouth every day. 30 tablet 0     No current facility-administered medications for this visit.     He  has a past medical history of Anxiety, Depression, Headache(784.0), Hyperlipemia, and Hypertension.    ROS   No chest pain, no shortness of breath, no abdominal pain       Objective:     /98 (BP Location: Left arm, Patient Position: Sitting, BP Cuff Size: Adult)   Pulse 69   Temp 37 °C (98.6 °F) (Temporal)   Resp 16   Ht 1.753 m (5' 9\")   Wt 102 kg (224 lb 9.6 oz)   SpO2 97%  Body mass index is 33.17 kg/m².   Physical Exam:  Constitutional: Alert, no distress.  Skin: Warm, dry, good turgor, no rashes in visible areas.  Eye: Equal, round, conjunctiva clear, lids normal.  Neck: Trachea midline, no masses, no thyromegaly.   Respiratory: Unlabored respiratory effort, lungs clear to auscultation, no wheezes, no ronchi.  Cardiovascular: Normal S1, S2, no murmur, no edema.  Psych: Alert and oriented x3, normal affect and mood.      Assessment and Plan:   The following treatment plan was discussed    1. Need for " vaccination  - Tdap Vaccine =>8YO IM    2. Benign essential HTN  Stable on current regimen.   Encouraged him to continue to work on diet and exercise.  I will see him again in 4 weeks.  Also encouraged him to obtain a blood pressure cuff at home.  Recommended by stat blood pressure cuff for more accurate readings.  Continue to monitor for any leg swelling.      Followup: Return in about 4 weeks (around 8/6/2021).       Laura Warren P.A.-C.  Supervising MD: Dr. Abdullahi Landers MD  07/09/21

## 2021-07-17 DIAGNOSIS — I10 ESSENTIAL HYPERTENSION: ICD-10-CM

## 2021-07-19 RX ORDER — AMLODIPINE BESYLATE 5 MG/1
5 TABLET ORAL DAILY
Qty: 30 TABLET | Refills: 0 | Status: SHIPPED | OUTPATIENT
Start: 2021-07-19 | End: 2021-08-09 | Stop reason: SDUPTHER

## 2021-07-28 ENCOUNTER — OFFICE VISIT (OUTPATIENT)
Dept: URGENT CARE | Facility: PHYSICIAN GROUP | Age: 36
End: 2021-07-28
Payer: COMMERCIAL

## 2021-07-28 ENCOUNTER — TELEMEDICINE (OUTPATIENT)
Dept: MEDICAL GROUP | Facility: LAB | Age: 36
End: 2021-07-28
Payer: COMMERCIAL

## 2021-07-28 ENCOUNTER — HOSPITAL ENCOUNTER (OUTPATIENT)
Dept: RADIOLOGY | Facility: MEDICAL CENTER | Age: 36
End: 2021-07-28
Attending: PHYSICIAN ASSISTANT
Payer: COMMERCIAL

## 2021-07-28 ENCOUNTER — HOSPITAL ENCOUNTER (OUTPATIENT)
Facility: MEDICAL CENTER | Age: 36
End: 2021-07-28
Attending: PHYSICIAN ASSISTANT
Payer: COMMERCIAL

## 2021-07-28 VITALS
DIASTOLIC BLOOD PRESSURE: 78 MMHG | OXYGEN SATURATION: 97 % | HEART RATE: 86 BPM | RESPIRATION RATE: 18 BRPM | HEIGHT: 70 IN | SYSTOLIC BLOOD PRESSURE: 128 MMHG | WEIGHT: 220 LBS | BODY MASS INDEX: 31.5 KG/M2 | TEMPERATURE: 99.5 F

## 2021-07-28 DIAGNOSIS — R50.9 FEVER, UNSPECIFIED FEVER CAUSE: ICD-10-CM

## 2021-07-28 DIAGNOSIS — J06.9 URI WITH COUGH AND CONGESTION: ICD-10-CM

## 2021-07-28 DIAGNOSIS — Z20.822 SUSPECTED COVID-19 VIRUS INFECTION: ICD-10-CM

## 2021-07-28 LAB
FLUAV+FLUBV AG SPEC QL IA: NEGATIVE
INT CON NEG: NORMAL
INT CON POS: NORMAL

## 2021-07-28 PROCEDURE — 87804 INFLUENZA ASSAY W/OPTIC: CPT | Performed by: PHYSICIAN ASSISTANT

## 2021-07-28 PROCEDURE — U0003 INFECTIOUS AGENT DETECTION BY NUCLEIC ACID (DNA OR RNA); SEVERE ACUTE RESPIRATORY SYNDROME CORONAVIRUS 2 (SARS-COV-2) (CORONAVIRUS DISEASE [COVID-19]), AMPLIFIED PROBE TECHNIQUE, MAKING USE OF HIGH THROUGHPUT TECHNOLOGIES AS DESCRIBED BY CMS-2020-01-R: HCPCS

## 2021-07-28 PROCEDURE — 99214 OFFICE O/P EST MOD 30 MIN: CPT | Mod: CS | Performed by: PHYSICIAN ASSISTANT

## 2021-07-28 PROCEDURE — U0005 INFEC AGEN DETEC AMPLI PROBE: HCPCS

## 2021-07-28 PROCEDURE — 71046 X-RAY EXAM CHEST 2 VIEWS: CPT

## 2021-07-28 PROCEDURE — 99212 OFFICE O/P EST SF 10 MIN: CPT | Mod: 95 | Performed by: PHYSICIAN ASSISTANT

## 2021-07-28 ASSESSMENT — FIBROSIS 4 INDEX: FIB4 SCORE: 0.92

## 2021-07-28 ASSESSMENT — ENCOUNTER SYMPTOMS: COUGH: 1

## 2021-07-28 NOTE — LETTER
July 28, 2021     Patient: Lonnie Ennis   YOB: 1985   Date of Visit: 7/28/2021       To Whom it May Concern:    Lonnie Ennis was seen in my clinic on 7/28/2021. A concern for COVID-19 has been identified and testing is in progress. They will be able to access their results through our electronic delivery system called [employer].     We are asking you to excuse absences while they follow self-isolation protocol per CDC guidelines.   • 10 days since symptoms first appeared and   • 24 hours with no fever without the use of fever-reducing medications and   • Other symptoms of COVID-19 are improving*  *Loss of taste and smell may persist for weeks or months after recovery and need not delay the end of isolation    Most people do not require testing to decide when they can be around others. If results are negative your employee must continue to follow the self-isolation protocol.    If the results of testing are positive then they will be contacted by the [facility] for further instructions on duration of self-isolation and return to work protocol. In general this will also follow the CDC guidelines with a minimum of 10 days from the onset with improving symptoms and no fever.    This is the only note that will be provided from [facility] for this visit. Please schedule a visit with primary care if documents for FMLA, disability, or unemployment are required.     If you have any questions or concerns, please don't hesitate to call.        Sincerely,           Perla Malagon P.A.-C.  Electronically Signed

## 2021-07-28 NOTE — PROGRESS NOTES
This evaluation was conducted via Zoom using secure and encrypted videoconferencing technology. The patient was in a private location in the [facility].    The patient's identity was confirmed and verbal consent was obtained for this virtual visit.    Subjective:     CC: Fever x3 days   Lonnie Ennis is a 35 y.o. male presenting to discuss the evaluation and management of Fever and back ache.     Fever  New concern, reports that he has had a fever starting Friday at 101F, Saturday of 101 °F and on Sunday fever turned to 103.0F.  Fever seems to have subsided at this point however he is having a lot of aching in his back .   Denies cough or congestion   Ports that his skin feels very tender to the touch on bilateral sides of his back   Admits to pain with deep inspiration on bilateral sides of chest and back    Denies any obvious rash on his back.      ROS  See HPI  Constitutional: +fatigue, +fevers   Respiratory: Negative for cough and shortness of breath.    Cardiovascular: Negative for leg swelling.   Skin: Negative for rash.   Psychiatric/Behavioral: Negative for depression.  The patient is not nervous/anxious.      No Known Allergies    Current medicines (including changes today)  Current Outpatient Medications   Medication Sig Dispense Refill   • amLODIPine (NORVASC) 5 MG Tab Take 1 tablet by mouth every day. 30 tablet 0   • escitalopram (LEXAPRO) 10 MG Tab Take 1 tablet by mouth every day. 90 tablet 1     No current facility-administered medications for this visit.       He  has a past medical history of Anxiety, Depression, Headache(784.0), Hyperlipemia, and Hypertension.  He  has no past surgical history on file.      History reviewed. No pertinent family history.  Family Status   Relation Name Status   • Mo unknown Alive   • Fa unknown Alive       Patient Active Problem List    Diagnosis Date Noted   • Fever 07/28/2021   • Obesity (BMI 30-39.9) 05/30/2017   • Anxiety and depression 08/08/2016   •  Benign essential HTN 07/31/2015   • Nasal congestion 07/31/2015   • Seasonal allergies 10/06/2014   • Hyperlipidemia 07/01/2014   • Generalized headaches 04/18/2014          Objective:   There were no vitals taken for this visit.    Physical Exam:  Constitutional: Alert, no distress, well-groomed.  Skin: No rashes in visible areas.  Eye: Round. Conjunctiva clear, lids normal. No icterus.   ENMT: Lips pink without lesions, good dentition, moist mucous membranes. Phonation normal.  Neck: No masses, no thyromegaly. Moves freely without pain.  Respiratory: Unlabored respiratory effort, no cough or audible wheeze  Psych: Alert and oriented x3, normal affect and mood.       Assessment and Plan:   The following treatment plan was discussed:     1. Fever, unspecified fever cause  New concern, fever starting on Friday.  Unclear etiology at this time, could be pneumonia versus Covid versus shingles versus UTI.  Of course I cannot adequately assess any of these via virtual visit.  I encouraged the patient to go to the urgent care today.  Advised him to leave work right away given the possibility that this may be COVID-19 infection.  He reports that he will go to the urgent care in [facility].  Continue to monitor symptoms follow-up as needed        Follow-up: Return if symptoms worsen or fail to improve.    Laura Warren P.A.-C.  Supervising MD: Dr. Abdullahi Landers MD  07/28/21

## 2021-07-28 NOTE — PROGRESS NOTES
Subjective:      Lonnie Ennis is a 35 y.o. male who presents with Cough (fever, body aches, chills, x5 days. )    HPI:  Lonnie Ennis is a 35 y.o. male who presents today for evaluaiton of URI symptoms.  Patient reports that for the past 5 days or so he has had mild cough, fever, body aches, and chills.  He states that he also has severe back pain that goes all the way up to his shoulder.  He denies any sick contacts.  He is not vaccinated for COVID-19 virus.  He has been taking OTC antipyretics to help with symptoms which provides modest relief.  He is not having any shortness of breath, chest pain, or lightheadedness/dizziness.      Review of Systems   Constitutional: Positive for chills, fever and malaise/fatigue.   HENT: Positive for congestion. Negative for ear pain, sinus pain and sore throat.    Eyes: Negative for blurred vision and pain.   Respiratory: Positive for cough. Negative for shortness of breath.    Cardiovascular: Negative for chest pain and palpitations.   Gastrointestinal: Negative for abdominal pain, diarrhea, nausea and vomiting.   Musculoskeletal: Positive for myalgias.   Skin: Negative for rash.   Neurological: Positive for headaches. Negative for dizziness.       PMH:  has a past medical history of Anxiety, Depression, Headache(784.0), Hyperlipemia, and Hypertension.  MEDS:   Current Outpatient Medications:   •  amLODIPine (NORVASC) 5 MG Tab, Take 1 tablet by mouth every day., Disp: 30 tablet, Rfl: 0  •  escitalopram (LEXAPRO) 10 MG Tab, Take 1 tablet by mouth every day., Disp: 90 tablet, Rfl: 1  ALLERGIES: No Known Allergies  SURGHX: No past surgical history on file.  SOCHX:  reports that he has never smoked. He has never used smokeless tobacco. He reports current alcohol use. He reports that he does not use drugs.  FH: Family history was reviewed, no pertinent findings to report     Objective:     /78   Pulse 86   Temp 37.5 °C (99.5 °F) (Temporal)   Resp 18   Ht 1.778 m (5'  "10\")   Wt 99.8 kg (220 lb)   SpO2 97%   BMI 31.57 kg/m²      Physical Exam  Constitutional:       Appearance: He is well-developed.   HENT:      Head: Normocephalic and atraumatic.      Right Ear: Tympanic membrane, ear canal and external ear normal.      Left Ear: Tympanic membrane, ear canal and external ear normal.   Eyes:      Conjunctiva/sclera: Conjunctivae normal.      Pupils: Pupils are equal, round, and reactive to light.   Cardiovascular:      Rate and Rhythm: Normal rate and regular rhythm.      Heart sounds: Normal heart sounds. No murmur heard.     Pulmonary:      Effort: Pulmonary effort is normal.      Breath sounds: Normal breath sounds. No wheezing.   Musculoskeletal:      Cervical back: Normal range of motion.   Lymphadenopathy:      Cervical: No cervical adenopathy.   Skin:     General: Skin is warm and dry.      Capillary Refill: Capillary refill takes less than 2 seconds.   Neurological:      Mental Status: He is alert and oriented to person, place, and time.   Psychiatric:         Behavior: Behavior normal.         Judgment: Judgment normal.            POCT Influenza A/B - Negative    COVID/SARS CoV-2 PCR - DETECTED       DX-CHEST-2 VIEWS  FINDINGS:  The mediastinal and cardiac silhouette is unremarkable.     The pulmonary vascularity is within normal limits.     The lung parenchyma is clear.     There is no significant pleural effusion.     There is no visible pneumothorax.     There are no acute bony abnormalities.     IMPRESSION:     1.  Unremarkable two view chest.                  *X-rays were reviewed and interpreted independently by me. I agree with the radiologist's findings     Assessment/Plan:     1. Suspected COVID-19 virus infection  - COVID/SARS CoV-2 PCR; Future    2. Fever, unspecified fever cause  - COVID/SARS CoV-2 PCR; Future  - POCT Influenza A/B  - DX-CHEST-2 VIEWS; Future    3. URI with cough and congestion  - COVID/SARS CoV-2 PCR; Future  - POCT Influenza A/B  - " DX-CHEST-2 VIEWS; Future  - OTC cold/flu medications  - PO fluids  - Rest  - Tylenol or ibuprofen as needed for fever > 100.4 F      Patient tested positive for COVID-19 virus.  He will need to self isolate for a full 10 days, day 1 being the first day that his symptoms started.  Supportive care reiterated.  Strict return/ER precautions discussed.              Differential Diagnosis, natural history, and supportive care discussed. Return to the Urgent Care or follow up with your PCP if symptoms fail to resolve, or for any new or worsening symptoms. Emergency room precautions discussed. Patient and/or family appears understanding of information.

## 2021-07-28 NOTE — ASSESSMENT & PLAN NOTE
New concern, reports that he has had a fever starting Friday at 101F, Saturday of 101 °F and on Sunday fever turned to 103.0F.  Fever seems to have subsided at this point however he is having a lot of aching in his back .   Denies cough or congestion   Ports that his skin feels very tender to the touch on bilateral sides of his back   Admits to pain with deep inspiration on bilateral sides of chest and back    Denies any obvious rash on his back.

## 2021-07-29 LAB
COVID ORDER STATUS COVID19: NORMAL
SARS-COV-2 RNA RESP QL NAA+PROBE: DETECTED
SPECIMEN SOURCE: ABNORMAL

## 2021-08-04 ASSESSMENT — ENCOUNTER SYMPTOMS
PALPITATIONS: 0
ABDOMINAL PAIN: 0
SORE THROAT: 0
SHORTNESS OF BREATH: 0
DIARRHEA: 0
CHILLS: 1
VOMITING: 0
MYALGIAS: 1
SINUS PAIN: 0
EYE PAIN: 0
NAUSEA: 0
BLURRED VISION: 0
HEADACHES: 1
DIZZINESS: 0
FEVER: 1

## 2021-08-09 ENCOUNTER — OFFICE VISIT (OUTPATIENT)
Dept: MEDICAL GROUP | Facility: LAB | Age: 36
End: 2021-08-09
Payer: COMMERCIAL

## 2021-08-09 ENCOUNTER — HOSPITAL ENCOUNTER (OUTPATIENT)
Facility: MEDICAL CENTER | Age: 36
End: 2021-08-09
Attending: PHYSICIAN ASSISTANT
Payer: COMMERCIAL

## 2021-08-09 VITALS
WEIGHT: 223.3 LBS | TEMPERATURE: 99.1 F | OXYGEN SATURATION: 100 % | RESPIRATION RATE: 18 BRPM | SYSTOLIC BLOOD PRESSURE: 122 MMHG | BODY MASS INDEX: 31.97 KG/M2 | HEART RATE: 72 BPM | DIASTOLIC BLOOD PRESSURE: 90 MMHG | HEIGHT: 70 IN

## 2021-08-09 DIAGNOSIS — R36.9 PENILE DISCHARGE: ICD-10-CM

## 2021-08-09 DIAGNOSIS — I10 ESSENTIAL HYPERTENSION: Primary | ICD-10-CM

## 2021-08-09 DIAGNOSIS — Z86.16 HISTORY OF COVID-19: ICD-10-CM

## 2021-08-09 PROCEDURE — 87086 URINE CULTURE/COLONY COUNT: CPT

## 2021-08-09 PROCEDURE — 87491 CHLMYD TRACH DNA AMP PROBE: CPT

## 2021-08-09 PROCEDURE — 81001 URINALYSIS AUTO W/SCOPE: CPT

## 2021-08-09 PROCEDURE — 99214 OFFICE O/P EST MOD 30 MIN: CPT | Performed by: PHYSICIAN ASSISTANT

## 2021-08-09 PROCEDURE — 87591 N.GONORRHOEAE DNA AMP PROB: CPT

## 2021-08-09 RX ORDER — AMLODIPINE BESYLATE 5 MG/1
5 TABLET ORAL DAILY
Qty: 90 TABLET | Refills: 1 | Status: SHIPPED | OUTPATIENT
Start: 2021-08-09 | End: 2021-09-20 | Stop reason: SDUPTHER

## 2021-08-09 ASSESSMENT — FIBROSIS 4 INDEX: FIB4 SCORE: 0.95

## 2021-08-09 NOTE — ASSESSMENT & PLAN NOTE
New to me, reports that he had one episode of penile discharge yesterday.  Denies any known STI.  Reports that he only has 1 partner, longtime girlfriend.  Denies dysuria, hematuria.  No abdominal pain or flank pain.

## 2021-08-09 NOTE — ASSESSMENT & PLAN NOTE
Follow-up, patient diagnosed with COVID-19.  Onset of symptoms were July 28, 2021  At that time he reported fever, body aches; which have since resolved.  He currently has loss of taste and smell.  Reports that taste is coming back somewhat but still cannot smell.  No fevers for over a week.  Does endorse loose stools in the a.m.  Denies any blood in the stool  Generally feeling better day by day.

## 2021-08-09 NOTE — PROGRESS NOTES
"Subjective:   Lonnie Ennis is a 36 y.o. male here today for follow-up on Covid, new concerns for penile discharge x1 day    History of COVID-19  Follow-up, patient diagnosed with COVID-19.  Onset of symptoms were July 28, 2021  At that time he reported fever, body aches; which have since resolved.  He currently has loss of taste and smell.  Reports that taste is coming back somewhat but still cannot smell.  No fevers for over a week.  Does endorse loose stools in the a.m.  Denies any blood in the stool  Generally feeling better day by day.    Penile discharge  New to me, reports that he had one episode of penile discharge yesterday.  Denies any known STI.  Reports that he only has 1 partner, longtime girlfriend.  Denies dysuria, hematuria.  No abdominal pain or flank pain.     Current medicines (including changes today)  Current Outpatient Medications   Medication Sig Dispense Refill   • amLODIPine (NORVASC) 5 MG Tab Take 1 tablet by mouth every day. 90 tablet 1   • escitalopram (LEXAPRO) 10 MG Tab Take 1 tablet by mouth every day. 90 tablet 1     No current facility-administered medications for this visit.     He  has a past medical history of Anxiety, Depression, Headache(784.0), Hyperlipemia, and Hypertension.    ROS   No chest pain, no shortness of breath, no abdominal pain       Objective:     /90 (BP Location: Left arm, Patient Position: Sitting, BP Cuff Size: Large adult)   Pulse 72   Temp 37.3 °C (99.1 °F) (Temporal)   Resp 18   Ht 1.778 m (5' 10\")   Wt 101 kg (223 lb 4.8 oz)   SpO2 100%  Body mass index is 32.04 kg/m².   Physical Exam:  Constitutional: Alert, no distress.  Skin: Warm, dry, good turgor, no rashes in visible areas.  Eye: Equal, round, conjunctiva clear, lids normal.  Neck: Trachea midline.   Respiratory: Unlabored respiratory effort, lungs clear to auscultation, no wheezes, no ronchi.  Cardiovascular: Normal S1, S2, no murmur, no edema.  Abdomen: Soft, non-tender, no masses, no " hepatosplenomegaly.  No CVA tenderness bilaterally.   Psych: Alert and oriented x3, normal affect and mood.    Assessment and Plan:   The following treatment plan was discussed    1. Essential hypertension  Blood pressure is better controlled today.  Will refill amlodipine at current dose.  No changes  Continue to monitor blood pressure at home.  Follow-up in 6 months.  - amLODIPine (NORVASC) 5 MG Tab; Take 1 tablet by mouth every day.  Dispense: 90 tablet; Refill: 1    2. History of COVID-19  Follow-up, patient doing well.  Initial symptoms of Covid began July 28, 2021.  Continues to have loss of sense of taste and smell.  Taste is coming back somewhat.  Does have loose stools.  Encouraged him to use Imodium as needed.  Increase fluids.  Brat diet.  Continue to monitor symptoms follow-up as needed.  May return to work today.    3. Penile discharge  New to me, new symptoms x1 day  We will rule out STI versus UTI.  Continue to monitor symptoms.  I will discuss results with him via MyChart once available for review.   - Chlamydia/GC PCR Urine Or Swab; Future  - URINALYSIS,CULTURE IF INDICATED; Future      Followup: Return in about 6 months (around 2/9/2022), or if symptoms worsen or fail to improve.       Laura Warren P.A.-C.  Supervising MD: Dr. Abdullahi Landers MD  08/09/21

## 2021-08-10 LAB
APPEARANCE UR: ABNORMAL
BACTERIA #/AREA URNS HPF: NEGATIVE /HPF
BILIRUB UR QL STRIP.AUTO: NEGATIVE
C TRACH DNA SPEC QL NAA+PROBE: NEGATIVE
COLOR UR: YELLOW
EPI CELLS #/AREA URNS HPF: ABNORMAL /HPF
GLUCOSE UR STRIP.AUTO-MCNC: NEGATIVE MG/DL
HYALINE CASTS #/AREA URNS LPF: ABNORMAL /LPF
KETONES UR STRIP.AUTO-MCNC: NEGATIVE MG/DL
LEUKOCYTE ESTERASE UR QL STRIP.AUTO: ABNORMAL
MICRO URNS: ABNORMAL
MUCOUS THREADS #/AREA URNS HPF: ABNORMAL /HPF
N GONORRHOEA DNA SPEC QL NAA+PROBE: NEGATIVE
NITRITE UR QL STRIP.AUTO: NEGATIVE
PH UR STRIP.AUTO: 5.5 [PH] (ref 5–8)
PROT UR QL STRIP: NEGATIVE MG/DL
RBC # URNS HPF: ABNORMAL /HPF
RBC UR QL AUTO: NEGATIVE
SP GR UR STRIP.AUTO: 1.02
SPECIMEN SOURCE: NORMAL
URATE CRY #/AREA URNS HPF: POSITIVE /HPF
UROBILINOGEN UR STRIP.AUTO-MCNC: 0.2 MG/DL
WBC #/AREA URNS HPF: ABNORMAL /HPF

## 2021-08-12 LAB
BACTERIA UR CULT: NORMAL
SIGNIFICANT IND 70042: NORMAL
SITE SITE: NORMAL
SOURCE SOURCE: NORMAL

## 2021-09-01 ENCOUNTER — PATIENT MESSAGE (OUTPATIENT)
Dept: MEDICAL GROUP | Facility: LAB | Age: 36
End: 2021-09-01

## 2021-09-01 DIAGNOSIS — F41.9 ANXIETY: ICD-10-CM

## 2021-09-01 RX ORDER — DIAZEPAM 2 MG/1
2 TABLET ORAL EVERY 12 HOURS PRN
Qty: 30 TABLET | Refills: 0 | Status: SHIPPED | OUTPATIENT
Start: 2021-09-01 | End: 2021-10-01

## 2021-09-01 RX ORDER — ESCITALOPRAM OXALATE 10 MG/1
10 TABLET ORAL DAILY
Qty: 90 TABLET | Refills: 1 | Status: SHIPPED | OUTPATIENT
Start: 2021-09-01 | End: 2021-12-14

## 2021-09-01 NOTE — TELEPHONE ENCOUNTER
----- Message from Lonnie Ennis sent at 9/1/2021  8:36 AM PDT -----  Regarding: Refill   Am I able to get a refill on the Valium?

## 2021-09-01 NOTE — PATIENT COMMUNICATION
Received request via: Patient    Was the patient seen in the last year in this department? Yes  8/9/2021  Does the patient have an active prescription (recently filled or refills available) for medication(s) requested? No

## 2021-09-01 NOTE — TELEPHONE ENCOUNTER
Received request via: Pharmacy    Was the patient seen in the last year in this department? Yes  8/9/2021  Does the patient have an active prescription (recently filled or refills available) for medication(s) requested? No

## 2021-09-20 ENCOUNTER — PATIENT MESSAGE (OUTPATIENT)
Dept: MEDICAL GROUP | Facility: LAB | Age: 36
End: 2021-09-20

## 2021-09-20 DIAGNOSIS — I10 ESSENTIAL HYPERTENSION: ICD-10-CM

## 2021-09-20 RX ORDER — AMLODIPINE BESYLATE 5 MG/1
5 TABLET ORAL DAILY
Qty: 90 TABLET | Refills: 1 | Status: SHIPPED | OUTPATIENT
Start: 2021-09-20 | End: 2022-03-22

## 2021-09-20 NOTE — TELEPHONE ENCOUNTER
----- Message from Lonnie Ennis sent at 9/20/2021 11:31 AM PDT -----  Regarding: Moved Pharmacies  Greetings,        The week before last I contacted my insurance as they would no long cover refills at [facility] as they only cover maintenance through that pharmacy. Insurance said that I need to call [facility] and have the scripts moved to [facility]. I called [facility] on [address] and asked them to please reach out to  [facility] for the transfer. Continued to follow up with [facility] 3 times and each time they say they are working on it but I still don’t get my prescriptions as they have me go back through the process and then nothing. Was wondering what I can do as I am out of my blood pressure meds.   Thank you for your time.  -Lonnie

## 2021-12-14 DIAGNOSIS — F41.9 ANXIETY: ICD-10-CM

## 2021-12-14 RX ORDER — ESCITALOPRAM OXALATE 10 MG/1
10 TABLET ORAL DAILY
Qty: 90 TABLET | Refills: 1 | Status: SHIPPED | OUTPATIENT
Start: 2021-12-14 | End: 2022-05-26 | Stop reason: SDUPTHER

## 2021-12-14 NOTE — TELEPHONE ENCOUNTER
Received request via: Pharmacy    Was the patient seen in the last year in this department? Yes  LOV 08/09/2021  Does the patient have an active prescription (recently filled or refills available) for medication(s) requested? No

## 2022-01-07 ENCOUNTER — OFFICE VISIT (OUTPATIENT)
Dept: MEDICAL GROUP | Facility: LAB | Age: 37
End: 2022-01-07
Payer: COMMERCIAL

## 2022-01-07 VITALS
WEIGHT: 231.6 LBS | HEIGHT: 70 IN | HEART RATE: 77 BPM | TEMPERATURE: 98.8 F | RESPIRATION RATE: 16 BRPM | DIASTOLIC BLOOD PRESSURE: 94 MMHG | SYSTOLIC BLOOD PRESSURE: 134 MMHG | BODY MASS INDEX: 33.16 KG/M2 | OXYGEN SATURATION: 100 %

## 2022-01-07 DIAGNOSIS — E78.5 HYPERLIPIDEMIA, UNSPECIFIED HYPERLIPIDEMIA TYPE: ICD-10-CM

## 2022-01-07 DIAGNOSIS — I10 BENIGN ESSENTIAL HTN: ICD-10-CM

## 2022-01-07 DIAGNOSIS — F41.9 ANXIETY AND DEPRESSION: ICD-10-CM

## 2022-01-07 DIAGNOSIS — F32.A ANXIETY AND DEPRESSION: ICD-10-CM

## 2022-01-07 DIAGNOSIS — R73.03 PREDIABETES: ICD-10-CM

## 2022-01-07 PROCEDURE — 99214 OFFICE O/P EST MOD 30 MIN: CPT | Performed by: PHYSICIAN ASSISTANT

## 2022-01-07 RX ORDER — ALPRAZOLAM 0.5 MG/1
0.5 TABLET ORAL
Qty: 10 TABLET | Refills: 0 | Status: CANCELLED | OUTPATIENT
Start: 2022-01-07 | End: 2022-01-17

## 2022-01-07 RX ORDER — ALPRAZOLAM 0.5 MG/1
0.5 TABLET ORAL
Qty: 10 TABLET | Refills: 0 | Status: SHIPPED | OUTPATIENT
Start: 2022-01-07 | End: 2022-08-24 | Stop reason: SDUPTHER

## 2022-01-07 ASSESSMENT — PATIENT HEALTH QUESTIONNAIRE - PHQ9: CLINICAL INTERPRETATION OF PHQ2 SCORE: 0

## 2022-01-07 ASSESSMENT — FIBROSIS 4 INDEX: FIB4 SCORE: 0.95

## 2022-01-07 NOTE — ASSESSMENT & PLAN NOTE
Follow up; currently using Lexapro 10mg PO.  Previously using Valium for acute panic attacks.   These have been improving though when they do happen seem to be worse and debilitating.   Would like to switch to Xanax to be used prn.

## 2022-01-07 NOTE — ASSESSMENT & PLAN NOTE
Follow up; not currently using medication for this, though did use Lipitor in the remote past.   Working on diet and exercise.   Due for labs in 3 months.

## 2022-01-07 NOTE — ASSESSMENT & PLAN NOTE
Follow up; BP is stable on current regimen.   Trying to decrease red meat and increase exercise as tolerated.   Does have a family history of HLD.

## 2022-01-07 NOTE — PROGRESS NOTES
"Subjective:   Lonnie Ennis is a 36 y.o. male here today for medication management, BP check     Anxiety and depression  Follow up; currently using Lexapro 10mg PO.  Previously using Valium for acute panic attacks.   These have been improving though when they do happen seem to be worse and debilitating.   Would like to switch to Xanax to be used prn.       Benign essential HTN  Follow up; BP is stable on current regimen.   Trying to decrease red meat and increase exercise as tolerated.   Does have a family history of HLD.     Hyperlipidemia  Follow up; not currently using medication for this, though did use Lipitor in the remote past.   Working on diet and exercise.   Due for labs in 3 months.     Prediabetes  Lab Results   Component Value Date/Time    HBA1C 5.8 (H) 07/02/2021 12:10 PM             Current medicines (including changes today)  Current Outpatient Medications   Medication Sig Dispense Refill   • ALPRAZolam (XANAX) 0.5 MG Tab Take 1 Tablet by mouth 1 time a day as needed for Sleep for up to 10 days. 10 Tablet 0   • escitalopram (LEXAPRO) 10 MG Tab TAKE 1 TABLET BY MOUTH EVERY DAY 90 Tablet 1   • amLODIPine (NORVASC) 5 MG Tab Take 1 Tablet by mouth every day. 90 Tablet 1     No current facility-administered medications for this visit.     He  has a past medical history of Anxiety, Depression, Headache(784.0), Hyperlipemia, and Hypertension.    ROS   No chest pain, no shortness of breath, no abdominal pain       Objective:     /94 (BP Location: Left arm, Patient Position: Sitting, BP Cuff Size: Large adult)   Pulse 77   Temp 37.1 °C (98.8 °F) (Temporal)   Resp 16   Ht 1.778 m (5' 10\")   Wt 105 kg (231 lb 9.6 oz)   SpO2 100%  Body mass index is 33.23 kg/m².   Physical Exam:  Constitutional: Alert, no distress.  Skin: Warm, dry, good turgor, no rashes in visible areas.  Eye: Equal, round, conjunctiva clear, lids normal.  Neck: Trachea midline, no masses, no thyromegaly. No cervical or " supraclavicular lymphadenopathy  Respiratory: Unlabored respiratory effort, lungs clear to auscultation, no wheezes, no ronchi.  Cardiovascular: Normal S1, S2, no murmur, no edema.  Psych: Alert and oriented x3, normal affect and mood.    Assessment and Plan:   The following treatment plan was discussed    1. Anxiety and depression  Follow up; no longer using Valium.   Rx for xanax as written.   Pt was educated on use and SEs of medication.  Continue to monitor symptoms of anxiety.   F/u in 3 months - of course sooner as needed   - ALPRAZolam (XANAX) 0.5 MG Tab; Take 1 Tablet by mouth 1 time a day as needed for Sleep for up to 10 days.  Dispense: 10 Tablet; Refill: 0    2. Benign essential HTN  Stable on current regime; follow up  Discussed diet modications and weight loss to help reduce BP.   Will revisit this in 3 months.   Continue to monitor BP at home     3. Hyperlipidemia, unspecified hyperlipidemia type  We recommend decreasing saturated fat which are found in meats that come from a cow or pig, and found in creams, cheeses, butter, ramirez, and fried foods. We recommend more vegetables, fruits, fish, nuts, olive oil and exercising 5 times a week for 30 minutes.  - Lipid Profile; Future    4. Prediabetes  We advise to reduce sugar/carbohydrate/alcohol, eat more vegetables and lean meats such as fish/chicken/turkey. We also recommend 30 minutes of cardiovascular exercise most days of the week.  BMI was assessed today and reviewed with patient as meeting criteria for the risk factor obesity.  Willing to change as well as barriers were assessed. A collaborative plan was made with the patient and goals were set.  Wt Readings from Last 3 Encounters:   01/07/22 105 kg (231 lb 9.6 oz)   08/09/21 101 kg (223 lb 4.8 oz)   07/28/21 99.8 kg (220 lb)   - Comp Metabolic Panel; Future  - HEMOGLOBIN A1C; Future      Followup: Return in about 3 months (around 4/7/2022).       Laura Warren P.A.-C.  Supervising MD:   Abdullahi Landers MD  01/07/22

## 2022-03-02 ENCOUNTER — TELEPHONE (OUTPATIENT)
Dept: MEDICAL GROUP | Facility: LAB | Age: 37
End: 2022-03-02
Payer: COMMERCIAL

## 2022-03-02 NOTE — TELEPHONE ENCOUNTER
Called patient in regards to his VV for Friday for his broken thumb. Provider offered to order an xray ahead of time or for him to be seen at [facility] or [facility]. LVM for patient to call back about how he would like to proceed.

## 2022-03-22 DIAGNOSIS — I10 ESSENTIAL HYPERTENSION: ICD-10-CM

## 2022-03-22 RX ORDER — AMLODIPINE BESYLATE 5 MG/1
5 TABLET ORAL DAILY
Qty: 90 TABLET | Refills: 1 | Status: SHIPPED | OUTPATIENT
Start: 2022-03-22 | End: 2023-01-03

## 2022-03-22 NOTE — TELEPHONE ENCOUNTER
Received request via: Pharmacy  1/7/2022lov  Was the patient seen in the last year in this department? Yes    Does the patient have an active prescription (recently filled or refills available) for medication(s) requested? No

## 2022-03-28 ENCOUNTER — OFFICE VISIT (OUTPATIENT)
Dept: MEDICAL GROUP | Facility: LAB | Age: 37
End: 2022-03-28
Payer: COMMERCIAL

## 2022-03-28 ENCOUNTER — HOSPITAL ENCOUNTER (OUTPATIENT)
Dept: RADIOLOGY | Facility: MEDICAL CENTER | Age: 37
End: 2022-03-28
Attending: PHYSICIAN ASSISTANT
Payer: COMMERCIAL

## 2022-03-28 VITALS
HEIGHT: 70 IN | OXYGEN SATURATION: 97 % | SYSTOLIC BLOOD PRESSURE: 126 MMHG | BODY MASS INDEX: 32.71 KG/M2 | WEIGHT: 228.5 LBS | RESPIRATION RATE: 16 BRPM | TEMPERATURE: 98.7 F | DIASTOLIC BLOOD PRESSURE: 94 MMHG | HEART RATE: 89 BPM
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
## 2022-03-28 PROCEDURE — 73130 X-RAY EXAM OF HAND: CPT | Mod: LT

## 2022-03-28 PROCEDURE — 73110 X-RAY EXAM OF WRIST: CPT | Mod: RT

## 2022-03-28 PROCEDURE — 99213 OFFICE O/P EST LOW 20 MIN: CPT | Performed by: PHYSICIAN ASSISTANT

## 2022-03-28 ASSESSMENT — FIBROSIS 4 INDEX: FIB4 SCORE: 0.95

## 2022-03-28 NOTE — ASSESSMENT & PLAN NOTE
"New to me;  Reports that he had a fall about 1 months ago.   Fell on an outstretched hands.  He continues to have pain of the right wrist, swelling of the dorsal aspect of the right wrist .  Reports that he has full ROM in the R thumb; minimal ROM in the left hand.  Feels like he may have dislocated his left thumb    Pain is 3/10, b/l; \"more of an annoyance.\"  Using IBU with relief.     Has dislocated several fingers on his left hand in the past.  "

## 2022-03-28 NOTE — PROGRESS NOTES
"Subjective:   CC: Lonnie Ennis is a 36 y.o. male here today for fall, right wrist pain and left thumb pain x1 month    HPI:  Fall  New to me;  Reports that he had a fall about 1 months ago.   Fell on an outstretched hands.  He continues to have pain of the right wrist, swelling of the dorsal aspect of the right wrist .  Reports that he has full ROM in the R thumb; minimal ROM in the left hand.  Feels like he may have dislocated his left thumb    Pain is 3/10, b/l; \"more of an annoyance.\"  Using IBU with relief.     Has dislocated several fingers on his left hand in the past.       Current medicines (including changes today)  Current Outpatient Medications   Medication Sig Dispense Refill   • amLODIPine (NORVASC) 5 MG Tab TAKE 1 TABLET BY MOUTH EVERY DAY 90 Tablet 1   • escitalopram (LEXAPRO) 10 MG Tab TAKE 1 TABLET BY MOUTH EVERY DAY 90 Tablet 1     No current facility-administered medications for this visit.     He  has a past medical history of Anxiety, Depression, Headache(784.0), Hyperlipemia, and Hypertension.    ROS  No chest pain, no shortness of breath, no abdominal pain       Objective:     /94 (BP Location: Left arm, Patient Position: Sitting, BP Cuff Size: Large adult)   Pulse 89   Temp 37.1 °C (98.7 °F) (Temporal)   Resp 16   Ht 1.778 m (5' 10\")   Wt 104 kg (228 lb 8 oz)   SpO2 97%  Body mass index is 32.79 kg/m².   Physical Exam:  Constitutional: Alert, no distress.  Skin: Warm, dry, good turgor, no rashes in visible areas.  Eye: Equal, round, conjunctiva clear, lids normal.  ENMT: Lips without lesions, good dentition, oropharynx clear. TMs pearly gray bilaterally.  Neck: Trachea midline, no masses, no thyromegaly. No cervical or supraclavicular lymphadenopathy  Respiratory: Unlabored respiratory effort  MSK: Moderate swelling of the dorsal aspect of the right hand.  Left thumb appears to be more prominent and suspect dislocation.  Decreased range of motion in the left thumb  Psych: Alert " and oriented x3, normal affect and mood.    Assessment and Plan:   The following treatment plan was discussed    1. Fall, initial encounter  Patient with fall approximately 4 weeks ago.  X-ray right wrist, rule out fracture.  Also recommend any x-ray of left hand to r/o dislocation.  Consider referral to Ortho/hand specialist, though I did recommend seeing us with urgent care or [facility] urgent care.  Will discuss imaging with him via MyChart once available for review.   - DX-WRIST-COMPLETE 3+ RIGHT; Future  - DX-HAND 3+ LEFT; Future      Followup: No follow-ups on file.       Laura Warren P.A.-C.  Supervising MD: Dr. Abdullahi Landers MD  03/28/22

## 2022-03-29 DIAGNOSIS — M25.531 RIGHT WRIST PAIN: ICD-10-CM

## 2022-03-29 DIAGNOSIS — M79.645 PAIN OF LEFT THUMB: ICD-10-CM

## 2022-03-29 NOTE — PROGRESS NOTES
1. Right wrist pain  Referral to Hand Surgery   2. Pain of left thumb  Referral to Hand Surgery     Laura Warren P.A.-C.

## 2022-04-15 ENCOUNTER — OFFICE VISIT (OUTPATIENT)
Dept: MEDICAL GROUP | Facility: LAB | Age: 37
End: 2022-04-15
Payer: COMMERCIAL

## 2022-04-15 VITALS
TEMPERATURE: 97.6 F | HEIGHT: 70 IN | HEART RATE: 72 BPM | WEIGHT: 221 LBS | DIASTOLIC BLOOD PRESSURE: 88 MMHG | BODY MASS INDEX: 31.64 KG/M2 | RESPIRATION RATE: 14 BRPM | SYSTOLIC BLOOD PRESSURE: 124 MMHG | OXYGEN SATURATION: 96 %

## 2022-04-15 DIAGNOSIS — H60.391 OTHER INFECTIVE ACUTE OTITIS EXTERNA OF RIGHT EAR: ICD-10-CM
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
## 2022-04-15 PROBLEM — R50.9 FEVER: Status: RESOLVED | Noted: 2021-07-28 | Resolved: 2022-04-15

## 2022-04-15 PROCEDURE — 99213 OFFICE O/P EST LOW 20 MIN: CPT | Performed by: FAMILY MEDICINE

## 2022-04-15 RX ORDER — NEOMYCIN SULFATE, POLYMYXIN B SULFATE AND HYDROCORTISONE 10; 3.5; 1 MG/ML; MG/ML; [USP'U]/ML
4 SUSPENSION/ DROPS AURICULAR (OTIC) 4 TIMES DAILY
Qty: 12 ML | Refills: 0 | Status: SHIPPED | OUTPATIENT
Start: 2022-04-15 | End: 2022-04-22

## 2022-04-15 ASSESSMENT — FIBROSIS 4 INDEX: FIB4 SCORE: 0.95

## 2022-04-15 NOTE — PROGRESS NOTES
"Subjective:     CC: Right ear pain    HPI:   Lonnie presents today with continued right ear pain.      Initially seen at urgent care last week after fever began about 1 week ago lasted for 3 days.  He was treated for possible AOM and otitis externa with Augmentin and eardrops.  He has not noticed much improvement.  Has not had fevers since then, no ear drainage.  Does have some nasal congestion and mild sore throat but main symptom is ear pain.    Medications, past medical history, allergies, and social history have been reviewed and updated.      Objective:       Exam:  /88 (BP Location: Left arm, Patient Position: Sitting, BP Cuff Size: Adult)   Pulse 72   Temp 36.4 °C (97.6 °F)   Resp 14   Ht 1.778 m (5' 10\")   Wt 100 kg (221 lb)   SpO2 96%   BMI 31.71 kg/m²  Body mass index is 31.71 kg/m².    Constitutional: Alert. Well appearing. No distress.  Skin: Warm, dry, good turgor, no visible rashes.  Eye: Equal, round and reactive to light, conjunctiva clear, lids normal.  ENMT: Moist mucous membranes.  Oropharynx clear.  Right ear canal is erythematous and edematous, tenderness present upon your speculum insertion.  Right TM is clear.  Left TM clear.    Respiratory: Normal effort.   Neuro: Moves all four extremities. No facial droop.  Psych: Answers questions appropriately. Normal affect and mood.    Assessment & Plan:     36 y.o. male with the following -     1. Other infective acute otitis externa of right ear  Continued erythema, edema, and tenderness to the right ear canal.  He did try Ciprodex drops from urgent care.  We will try different drops.  Follow-up if not improving.  - neomycin-polymyxin-HC (PEDIOTIC HC) 3.5-97319-8 Suspension; Administer 4 Drops into affected ear(s) 4 times a day for 7 days.  Dispense: 12 mL; Refill: 0        Please note that this note was created using voice recognition software.      "

## 2022-05-26 DIAGNOSIS — F41.9 ANXIETY: ICD-10-CM

## 2022-05-26 RX ORDER — ESCITALOPRAM OXALATE 10 MG/1
10 TABLET ORAL DAILY
Qty: 90 TABLET | Refills: 1 | Status: SHIPPED | OUTPATIENT
Start: 2022-05-26 | End: 2022-12-01

## 2022-05-26 NOTE — TELEPHONE ENCOUNTER
Received request via: Patient    Was the patient seen in the last year in this department? Yes  4/15/22  Does the patient have an active prescription (recently filled or refills available) for medication(s) requested? No

## 2022-06-03 ENCOUNTER — HOSPITAL ENCOUNTER (OUTPATIENT)
Dept: LAB | Facility: MEDICAL CENTER | Age: 37
End: 2022-06-03
Attending: PHYSICIAN ASSISTANT
Payer: COMMERCIAL

## 2022-06-03 DIAGNOSIS — R73.03 PREDIABETES: ICD-10-CM

## 2022-06-03 DIAGNOSIS — E78.5 HYPERLIPIDEMIA, UNSPECIFIED HYPERLIPIDEMIA TYPE: ICD-10-CM

## 2022-06-03 LAB
ALBUMIN SERPL BCP-MCNC: 4.8 G/DL (ref 3.2–4.9)
ALBUMIN/GLOB SERPL: 1.5 G/DL
ALP SERPL-CCNC: 64 U/L (ref 30–99)
ALT SERPL-CCNC: 19 U/L (ref 2–50)
ANION GAP SERPL CALC-SCNC: 11 MMOL/L (ref 7–16)
AST SERPL-CCNC: 25 U/L (ref 12–45)
BILIRUB SERPL-MCNC: 0.6 MG/DL (ref 0.1–1.5)
BUN SERPL-MCNC: 12 MG/DL (ref 8–22)
CALCIUM SERPL-MCNC: 9.7 MG/DL (ref 8.5–10.5)
CHLORIDE SERPL-SCNC: 101 MMOL/L (ref 96–112)
CHOLEST SERPL-MCNC: 223 MG/DL (ref 100–199)
CO2 SERPL-SCNC: 24 MMOL/L (ref 20–33)
CREAT SERPL-MCNC: 0.97 MG/DL (ref 0.5–1.4)
EST. AVERAGE GLUCOSE BLD GHB EST-MCNC: 117 MG/DL
FASTING STATUS PATIENT QL REPORTED: NORMAL
GFR SERPLBLD CREATININE-BSD FMLA CKD-EPI: 104 ML/MIN/1.73 M 2
GLOBULIN SER CALC-MCNC: 3.3 G/DL (ref 1.9–3.5)
GLUCOSE SERPL-MCNC: 81 MG/DL (ref 65–99)
HBA1C MFR BLD: 5.7 % (ref 4–5.6)
HDLC SERPL-MCNC: 43 MG/DL
LDLC SERPL CALC-MCNC: 158 MG/DL
POTASSIUM SERPL-SCNC: 4.3 MMOL/L (ref 3.6–5.5)
PROT SERPL-MCNC: 8.1 G/DL (ref 6–8.2)
SODIUM SERPL-SCNC: 136 MMOL/L (ref 135–145)
TRIGL SERPL-MCNC: 110 MG/DL (ref 0–149)

## 2022-06-03 PROCEDURE — 36415 COLL VENOUS BLD VENIPUNCTURE: CPT

## 2022-06-03 PROCEDURE — 80061 LIPID PANEL: CPT

## 2022-06-03 PROCEDURE — 83036 HEMOGLOBIN GLYCOSYLATED A1C: CPT

## 2022-06-03 PROCEDURE — 80053 COMPREHEN METABOLIC PANEL: CPT

## 2022-08-24 DIAGNOSIS — F41.9 ANXIETY AND DEPRESSION: ICD-10-CM

## 2022-08-24 DIAGNOSIS — F32.A ANXIETY AND DEPRESSION: ICD-10-CM

## 2022-08-24 RX ORDER — ALPRAZOLAM 0.5 MG/1
0.5 TABLET ORAL
Qty: 10 TABLET | Refills: 0 | Status: SHIPPED | OUTPATIENT
Start: 2022-08-24 | End: 2022-12-01 | Stop reason: SDUPTHER

## 2022-12-01 ENCOUNTER — OFFICE VISIT (OUTPATIENT)
Dept: MEDICAL GROUP | Facility: LAB | Age: 37
End: 2022-12-01
Payer: COMMERCIAL

## 2022-12-01 VITALS
OXYGEN SATURATION: 99 % | WEIGHT: 218 LBS | DIASTOLIC BLOOD PRESSURE: 76 MMHG | BODY MASS INDEX: 31.21 KG/M2 | HEART RATE: 80 BPM | TEMPERATURE: 98 F | HEIGHT: 70 IN | SYSTOLIC BLOOD PRESSURE: 118 MMHG | RESPIRATION RATE: 12 BRPM

## 2022-12-01 DIAGNOSIS — I10 BENIGN ESSENTIAL HTN: ICD-10-CM

## 2022-12-01 DIAGNOSIS — F41.9 ANXIETY AND DEPRESSION: ICD-10-CM

## 2022-12-01 DIAGNOSIS — F32.A ANXIETY AND DEPRESSION: ICD-10-CM

## 2022-12-01 DIAGNOSIS — F41.9 ANXIETY: ICD-10-CM

## 2022-12-01 PROCEDURE — 99214 OFFICE O/P EST MOD 30 MIN: CPT | Performed by: FAMILY MEDICINE

## 2022-12-01 RX ORDER — ESCITALOPRAM OXALATE 20 MG/1
20 TABLET ORAL DAILY
Qty: 90 TABLET | Refills: 1 | Status: SHIPPED | OUTPATIENT
Start: 2022-12-01

## 2022-12-01 RX ORDER — ALPRAZOLAM 0.5 MG/1
0.5 TABLET ORAL
Qty: 10 TABLET | Refills: 0 | Status: SHIPPED | OUTPATIENT
Start: 2022-12-01 | End: 2023-03-14

## 2022-12-01 ASSESSMENT — FIBROSIS 4 INDEX: FIB4 SCORE: 0.86

## 2022-12-01 NOTE — PROGRESS NOTES
"Lonnie Ennis is a 37 y.o. male here to establish care and discuss anxiety, BP.    BP  Well controlled on amlodipine.    Anxiety  Chronic on lexapro, using Xanax 1-2 times weekly.    Current medicines (including changes today)  Current Outpatient Medications   Medication Sig Dispense Refill    escitalopram (LEXAPRO) 20 MG tablet Take 1 Tablet by mouth every day. 90 Tablet 1    ALPRAZolam (XANAX) 0.5 MG Tab Take 1 Tablet by mouth 1 time a day as needed for Anxiety for up to 30 days. 10 Tablet 0    amLODIPine (NORVASC) 5 MG Tab TAKE 1 TABLET BY MOUTH EVERY DAY 90 Tablet 1     No current facility-administered medications for this visit.     He  has a past medical history of Anxiety, Depression, Headache(784.0), Hyperlipemia, and Hypertension.  He  has no past surgical history on file.  Social History     Tobacco Use    Smoking status: Never    Smokeless tobacco: Never   Vaping Use    Vaping Use: Never used   Substance Use Topics    Alcohol use: Yes     Alcohol/week: 0.0 oz     Comment: 1 drink week    Drug use: No     Social History     Social History Narrative    Not on file     No family history on file.  Family Status   Relation Name Status    Mo unknown Alive    Fa unknown Alive       ROS  See HPI     Objective:     Physical Exam:  /76 (BP Location: Left arm, Patient Position: Sitting, BP Cuff Size: Large adult)   Pulse 80   Temp 36.7 °C (98 °F)   Resp 12   Ht 1.778 m (5' 10\")   Wt 98.9 kg (218 lb)   SpO2 99%  Body mass index is 31.28 kg/m².  Constitutional: Alert. Well appearing. No distress.  Skin: Warm, dry, good turgor, no visible rashes.  ENMT: Moist mucous membranes.   Respiratory: Normal effort.   Neuro: Moves all four extremities. No facial droop.  Psych: Answers questions appropriately. Normal affect and mood.    Assessment and Plan:     1. Anxiety and depression  Chronic, anxiety uncontrolled-use Xanax 1-2 times weekly.  We discussed that ideally would limit Xanax to rare use or not need to " use this at all.  We will increase Lexapro to 20 mg daily.  Continue Xanax as needed for now with hopes to taper use.  Follow-up 1 month.    - ALPRAZolam (XANAX) 0.5 MG Tab; Take 1 Tablet by mouth 1 time a day as needed for Anxiety for up to 30 days.  Dispense: 10 Tablet; Refill: 0  - escitalopram (LEXAPRO) 20 MG tablet; Take 1 Tablet by mouth every day.  Dispense: 90 Tablet; Refill: 1    3. Benign essential HTN  Well-controlled, continue amlodipine.    Follow up: Return in about 4 weeks (around 12/29/2022).         PLEASE NOTE: This note was created using voice recognition software.

## 2023-01-03 ENCOUNTER — OFFICE VISIT (OUTPATIENT)
Dept: MEDICAL GROUP | Facility: LAB | Age: 38
End: 2023-01-03
Payer: COMMERCIAL

## 2023-01-03 VITALS
HEART RATE: 86 BPM | TEMPERATURE: 97 F | DIASTOLIC BLOOD PRESSURE: 76 MMHG | WEIGHT: 223.8 LBS | OXYGEN SATURATION: 99 % | RESPIRATION RATE: 12 BRPM | HEIGHT: 70 IN | SYSTOLIC BLOOD PRESSURE: 114 MMHG | BODY MASS INDEX: 32.04 KG/M2

## 2023-01-03 DIAGNOSIS — F32.A ANXIETY AND DEPRESSION: ICD-10-CM

## 2023-01-03 DIAGNOSIS — F41.9 ANXIETY AND DEPRESSION: ICD-10-CM

## 2023-01-03 DIAGNOSIS — I10 BENIGN ESSENTIAL HTN: ICD-10-CM

## 2023-01-03 DIAGNOSIS — F51.04 PSYCHOPHYSIOLOGICAL INSOMNIA: ICD-10-CM

## 2023-01-03 PROBLEM — R36.9 PENILE DISCHARGE: Status: RESOLVED | Noted: 2021-08-09 | Resolved: 2023-01-03

## 2023-01-03 PROCEDURE — 99214 OFFICE O/P EST MOD 30 MIN: CPT | Performed by: FAMILY MEDICINE

## 2023-01-03 ASSESSMENT — FIBROSIS 4 INDEX: FIB4 SCORE: 0.86

## 2023-01-03 ASSESSMENT — PATIENT HEALTH QUESTIONNAIRE - PHQ9: CLINICAL INTERPRETATION OF PHQ2 SCORE: 0

## 2023-01-04 NOTE — PROGRESS NOTES
"Subjective:     CC: BP and mood follow up    HPI:   Lonnie presents today follow-up on blood pressure and mood.  He has been off of amlodipine for 1 month with good blood pressure control.    Lexapro increased to 20 mg last month and he has seen significant improvement with anxiety and depression, still having some stomach upset but this is tolerable.  Continues to use 0.25 mg Xanax occasionally for trouble getting back to sleep in the middle of the night but he is avoiding using this nightly.    Medications, past medical history, allergies, and social history have been reviewed and updated.      Objective:       Exam:  /76 (BP Location: Right arm, Patient Position: Sitting, BP Cuff Size: Large adult)   Pulse 86   Temp 36.1 °C (97 °F)   Resp 12   Ht 1.778 m (5' 10\")   Wt 102 kg (223 lb 12.8 oz)   SpO2 99%   BMI 32.11 kg/m²  Body mass index is 32.11 kg/m².    Constitutional: Alert. Well appearing. No distress.  Skin: Warm, dry, good turgor, no visible rashes.  Eye: Equal, round and reactive to light, conjunctiva clear, lids normal.  Neuro: Moves all four extremities. No facial droop.  Psych: Answers questions appropriately. Normal affect and mood.    Assessment & Plan:     37 y.o. male with the following -     1. Benign essential HTN  Well controlled off of amlodipine for 1 month.  Stop amlodipine and continue to monitor at home.    2. Psychophysiological insomnia  Continues with occasional Xanax use for trouble getting back to sleep.  Will take a half of a tab every few nights if needed.  Discussed continuing to try to taper use and continue to work on sleep hygiene.      3. Anxiety and depression  Improved with Lexapro 20 mg daily and this is continued.      Please note that this note was created using voice recognition software.      "

## 2023-06-21 ENCOUNTER — OFFICE VISIT (OUTPATIENT)
Dept: MEDICAL GROUP | Facility: LAB | Age: 38
End: 2023-06-21
Payer: COMMERCIAL

## 2023-06-21 VITALS
DIASTOLIC BLOOD PRESSURE: 86 MMHG | OXYGEN SATURATION: 99 % | RESPIRATION RATE: 14 BRPM | TEMPERATURE: 97.3 F | BODY MASS INDEX: 31.01 KG/M2 | HEART RATE: 68 BPM | WEIGHT: 209.4 LBS | SYSTOLIC BLOOD PRESSURE: 124 MMHG | HEIGHT: 69 IN

## 2023-06-21 DIAGNOSIS — F41.9 ANXIETY AND DEPRESSION: ICD-10-CM

## 2023-06-21 DIAGNOSIS — F51.04 PSYCHOPHYSIOLOGICAL INSOMNIA: ICD-10-CM

## 2023-06-21 DIAGNOSIS — I10 BENIGN ESSENTIAL HTN: ICD-10-CM

## 2023-06-21 DIAGNOSIS — E66.9 OBESITY (BMI 30-39.9): ICD-10-CM

## 2023-06-21 DIAGNOSIS — Z00.00 WELL ADULT EXAM: ICD-10-CM

## 2023-06-21 DIAGNOSIS — F32.A ANXIETY AND DEPRESSION: ICD-10-CM

## 2023-06-21 PROCEDURE — 99214 OFFICE O/P EST MOD 30 MIN: CPT | Performed by: FAMILY MEDICINE

## 2023-06-21 PROCEDURE — 3074F SYST BP LT 130 MM HG: CPT | Performed by: FAMILY MEDICINE

## 2023-06-21 PROCEDURE — 3079F DIAST BP 80-89 MM HG: CPT | Performed by: FAMILY MEDICINE

## 2023-06-21 RX ORDER — HYDROXYZINE HYDROCHLORIDE 25 MG/1
25-50 TABLET, FILM COATED ORAL NIGHTLY PRN
Qty: 60 TABLET | Refills: 3 | Status: SHIPPED | OUTPATIENT
Start: 2023-06-21 | End: 2023-07-17

## 2023-06-21 ASSESSMENT — FIBROSIS 4 INDEX: FIB4 SCORE: 0.86

## 2023-06-21 NOTE — PROGRESS NOTES
"Subjective:     CC: Follow up, sleep    HPI:   Lonnie presents today to follow-up on chronic conditions and discuss sleep.  Has been working hard on increasing diet and cutting portions.  Has lost 13 pounds over the last 5 months.  Checking BP at home and reports no values above 140/90, has been off of amlodipine since January.  Reports mood well controlled with Lexapro but he is having trouble falling asleep.  We will have 1 week every month or so where he has a lot of trouble falling asleep and will only get a few hours of sleep nightly.    Medications, past medical history, allergies, and social history have been reviewed and updated.      Objective:       Exam:  /86 (BP Location: Left arm, Patient Position: Sitting, BP Cuff Size: Large adult)   Pulse 68   Temp 36.3 °C (97.3 °F)   Resp 14   Ht 1.76 m (5' 9.29\")   Wt 95 kg (209 lb 6.4 oz)   SpO2 99%   BMI 30.66 kg/m²  Body mass index is 30.66 kg/m².    Constitutional: Alert. Well appearing. No distress.  Skin: Warm, dry, good turgor, no visible rashes.  ENMT: Moist mucous membranes. Normal dentition.  Respiratory: Normal effort. Lungs are clear to auscultation bilaterally.  Cardiovascular: Regular rate and rhythm. Normal S1/S2. No murmurs, rubs or gallops.   Neuro: Moves all four extremities. No facial droop.  Psych: Answers questions appropriately. Normal affect and mood.      Assessment & Plan:     37 y.o. male with the following -     1. Psychophysiological insomnia  Trouble falling asleep, does report mood is well controlled with Lexapro.  Discussed sleep hygiene, trial hydroxyzine as needed.  - hydrOXYzine HCl (ATARAX) 25 MG Tab; Take 1-2 Tablets by mouth at bedtime as needed (sleep).  Dispense: 60 Tablet; Refill: 3    2. Obesity (BMI 30-39.9)  Having success with weight loss through limiting portions and more exercise.  Congratulated and encouraged to continue with changes.    3. Anxiety and depression  Stable, continue Lexapro.    4. Benign " essential HTN  Currently well controlled without medication.  Continue diet, exercise, weight loss.    5. Well adult exam  - CBC WITH DIFFERENTIAL; Future  - Comp Metabolic Panel; Future  - Lipid Profile; Future  - HEMOGLOBIN A1C; Future  - TSH WITH REFLEX TO FT4; Future    Please note that this note was created using voice recognition software.

## 2023-07-14 ENCOUNTER — HOSPITAL ENCOUNTER (OUTPATIENT)
Dept: LAB | Facility: MEDICAL CENTER | Age: 38
End: 2023-07-14
Attending: FAMILY MEDICINE
Payer: COMMERCIAL

## 2023-07-14 DIAGNOSIS — Z00.00 WELL ADULT EXAM: ICD-10-CM

## 2023-07-14 LAB
BASOPHILS # BLD AUTO: 0.5 % (ref 0–1.8)
BASOPHILS # BLD: 0.02 K/UL (ref 0–0.12)
CALCIUM ALBUM COR SERPL-MCNC: 9 MG/DL (ref 8.5–10.5)
CHOLEST SERPL-MCNC: 214 MG/DL (ref 100–199)
EOSINOPHIL # BLD AUTO: 0.04 K/UL (ref 0–0.51)
EOSINOPHIL NFR BLD: 1 % (ref 0–6.9)
ERYTHROCYTE [DISTWIDTH] IN BLOOD BY AUTOMATED COUNT: 42.5 FL (ref 35.9–50)
EST. AVERAGE GLUCOSE BLD GHB EST-MCNC: 120 MG/DL
HBA1C MFR BLD: 5.8 % (ref 4–5.6)
HCT VFR BLD AUTO: 47.4 % (ref 42–52)
HDLC SERPL-MCNC: 47 MG/DL
HGB BLD-MCNC: 15.6 G/DL (ref 14–18)
IMM GRANULOCYTES # BLD AUTO: 0 K/UL (ref 0–0.11)
IMM GRANULOCYTES NFR BLD AUTO: 0 % (ref 0–0.9)
LDLC SERPL CALC-MCNC: 154 MG/DL
LYMPHOCYTES # BLD AUTO: 1.57 K/UL (ref 1–4.8)
LYMPHOCYTES NFR BLD: 40.1 % (ref 22–41)
MCH RBC QN AUTO: 28.3 PG (ref 27–33)
MCHC RBC AUTO-ENTMCNC: 32.9 G/DL (ref 32.3–36.5)
MCV RBC AUTO: 85.9 FL (ref 81.4–97.8)
MONOCYTES # BLD AUTO: 0.29 K/UL (ref 0–0.85)
MONOCYTES NFR BLD AUTO: 7.4 % (ref 0–13.4)
NEUTROPHILS # BLD AUTO: 2 K/UL (ref 1.82–7.42)
NEUTROPHILS NFR BLD: 51 % (ref 44–72)
NRBC # BLD AUTO: 0 K/UL
NRBC BLD-RTO: 0 /100 WBC (ref 0–0.2)
PLATELET # BLD AUTO: 209 K/UL (ref 164–446)
PMV BLD AUTO: 9.8 FL (ref 9–12.9)
RBC # BLD AUTO: 5.52 M/UL (ref 4.7–6.1)
TRIGL SERPL-MCNC: 63 MG/DL (ref 0–149)
TSH SERPL DL<=0.005 MIU/L-ACNC: 0.64 UIU/ML (ref 0.38–5.33)
WBC # BLD AUTO: 3.9 K/UL (ref 4.8–10.8)

## 2023-07-14 PROCEDURE — 36415 COLL VENOUS BLD VENIPUNCTURE: CPT

## 2023-07-14 PROCEDURE — 85025 COMPLETE CBC W/AUTO DIFF WBC: CPT

## 2023-07-14 PROCEDURE — 84443 ASSAY THYROID STIM HORMONE: CPT

## 2023-07-14 PROCEDURE — 83036 HEMOGLOBIN GLYCOSYLATED A1C: CPT

## 2023-07-14 PROCEDURE — 80053 COMPREHEN METABOLIC PANEL: CPT

## 2023-07-14 PROCEDURE — 80061 LIPID PANEL: CPT

## 2023-07-15 LAB
ALBUMIN SERPL BCP-MCNC: 4.8 G/DL (ref 3.2–4.9)
ALBUMIN/GLOB SERPL: 1.8 G/DL
ALP SERPL-CCNC: 46 U/L (ref 30–99)
ALT SERPL-CCNC: 19 U/L (ref 2–50)
ANION GAP SERPL CALC-SCNC: 13 MMOL/L (ref 7–16)
AST SERPL-CCNC: 19 U/L (ref 12–45)
BILIRUB SERPL-MCNC: 1.3 MG/DL (ref 0.1–1.5)
BUN SERPL-MCNC: 11 MG/DL (ref 8–22)
CALCIUM SERPL-MCNC: 9.6 MG/DL (ref 8.5–10.5)
CHLORIDE SERPL-SCNC: 98 MMOL/L (ref 96–112)
CO2 SERPL-SCNC: 24 MMOL/L (ref 20–33)
CREAT SERPL-MCNC: 0.96 MG/DL (ref 0.5–1.4)
GFR SERPLBLD CREATININE-BSD FMLA CKD-EPI: 104 ML/MIN/1.73 M 2
GLOBULIN SER CALC-MCNC: 2.7 G/DL (ref 1.9–3.5)
GLUCOSE SERPL-MCNC: 76 MG/DL (ref 65–99)
POTASSIUM SERPL-SCNC: 3.9 MMOL/L (ref 3.6–5.5)
PROT SERPL-MCNC: 7.5 G/DL (ref 6–8.2)
SODIUM SERPL-SCNC: 135 MMOL/L (ref 135–145)

## 2023-07-17 DIAGNOSIS — F51.04 PSYCHOPHYSIOLOGICAL INSOMNIA: ICD-10-CM

## 2023-07-17 RX ORDER — HYDROXYZINE HYDROCHLORIDE 25 MG/1
25-50 TABLET, FILM COATED ORAL NIGHTLY PRN
Qty: 60 TABLET | Refills: 3 | Status: SHIPPED | OUTPATIENT
Start: 2023-07-17

## 2023-07-17 NOTE — TELEPHONE ENCOUNTER
Received request via: Pharmacy    Was the patient seen in the last year in this department? Yes  LOV 06/21/2023  Does the patient have an active prescription (recently filled or refills available) for medication(s) requested? No    Does the patient have [facility] and need 100 day supply (blood pressure, diabetes and cholesterol meds only)? Medication is not for cholesterol, blood pressure or diabetes and Patient does not have SCP

## 2024-01-01 NOTE — TELEPHONE ENCOUNTER
Was the patient seen in the last year in this department? Yes8/29/19    Does patient have an active prescription for medications requested? No     Received Request Via: Pharmacy  
2024 00:06

## 2024-01-11 ENCOUNTER — TELEMEDICINE (OUTPATIENT)
Dept: MEDICAL GROUP | Facility: LAB | Age: 39
End: 2024-01-11
Payer: COMMERCIAL

## 2024-01-11 VITALS — WEIGHT: 217 LBS | BODY MASS INDEX: 31.78 KG/M2

## 2024-01-11 DIAGNOSIS — R22.0 LEFT FACIAL SWELLING: ICD-10-CM

## 2024-01-11 PROCEDURE — 99213 OFFICE O/P EST LOW 20 MIN: CPT | Mod: 95 | Performed by: FAMILY MEDICINE

## 2024-01-11 RX ORDER — AMOXICILLIN AND CLAVULANATE POTASSIUM 875; 125 MG/1; MG/1
1 TABLET, FILM COATED ORAL 2 TIMES DAILY
Qty: 20 TABLET | Refills: 0 | Status: SHIPPED | OUTPATIENT
Start: 2024-01-11 | End: 2024-01-21

## 2024-01-11 ASSESSMENT — FIBROSIS 4 INDEX: FIB4 SCORE: 0.77

## 2024-01-11 NOTE — PROGRESS NOTES
Virtual Visit: Established Patient   This visit was conducted via Zoom using secure and encrypted videoconferencing technology.   The patient was in a private location outside of their home in the state [facility].    The patient's identity was confirmed and verbal consent was obtained for this virtual visit.    Subjective:   CC:   Chief Complaint   Patient presents with    Jaw Pain     L kendrick Ennis is a 38 y.o. male presenting for evaluation and management of left sided face swelling and pain.  Symptoms began 1 week ago and have worsened.  No significant tenderness or overlying erythema.  No pain to the left lower teeth, no recent dental infection, no gum tenderness/swelling, recent dental work.  No trauma.  No fevers.  He has noticed some possible mild pain with salivation when eating an orange but also has sensitive teeth so unsure if this was just the acid.  He denies cervical LAD.  No significant neck pain/stiffness.    ROS   See HPI    Current medicines (including changes today)  Current Outpatient Medications   Medication Sig Dispense Refill    hydrOXYzine HCl (ATARAX) 25 MG Tab TAKE 1-2 TABLETS BY MOUTH AT BEDTIME AS NEEDED (SLEEP). 60 Tablet 3    escitalopram (LEXAPRO) 20 MG tablet Take 1 Tablet by mouth every day. 90 Tablet 1     No current facility-administered medications for this visit.       Patient Active Problem List    Diagnosis Date Noted    Prediabetes 01/07/2022    History of COVID-19 08/09/2021    Obesity (BMI 30-39.9) 05/30/2017    Anxiety and depression 08/08/2016    Benign essential HTN 07/31/2015    Nasal congestion 07/31/2015    Seasonal allergies 10/06/2014    Hyperlipidemia 07/01/2014    Generalized headaches 04/18/2014        Objective:   There were no vitals taken for this visit.    Physical Exam:  Constitutional: Alert, no distress, well-groomed.  Skin: No rashes in visible areas.  Eye: Round. Conjunctiva clear, lids normal. No icterus.   ENMT: Lips pink without lesions,  good dentition, moist mucous membranes. Phonation normal.  Neck: No masses, no thyromegaly. Moves freely without pain.  Respiratory: Unlabored respiratory effort, no cough or audible wheeze  Psych: Alert and oriented x3, normal affect and mood.     Assessment and Plan:   The following treatment plan was discussed:     1. Left facial swelling  Presents with 1 week of left-sided facial swelling just below the angle of the jaw.  No dental pain or obvious symptoms of dental infection.  Does think there has been some pain with salivation.  Possible salivary gland infection versus stone.  Limited evaluation today secondary to virtual visit.  Ultrasound ordered for further evaluation.  Will cover for infection with course of Augmentin.  Discussed ER precautions with new or worsening symptoms including worsening pain or fevers.  - US-SOFT TISSUES OF HEAD - NECK; Future  - amoxicillin-clavulanate (AUGMENTIN) 875-125 MG Tab; Take 1 Tablet by mouth 2 times a day for 10 days.  Dispense: 20 Tablet; Refill: 0      Follow-up: No follow-ups on file.

## 2024-01-14 ENCOUNTER — HOSPITAL ENCOUNTER (OUTPATIENT)
Dept: RADIOLOGY | Facility: MEDICAL CENTER | Age: 39
End: 2024-01-14
Attending: FAMILY MEDICINE
Payer: COMMERCIAL

## 2024-01-14 DIAGNOSIS — R22.0 LEFT FACIAL SWELLING: ICD-10-CM

## 2024-01-14 PROCEDURE — 76536 US EXAM OF HEAD AND NECK: CPT

## 2024-03-31 DIAGNOSIS — F51.04 PSYCHOPHYSIOLOGICAL INSOMNIA: ICD-10-CM

## 2024-03-31 NOTE — TELEPHONE ENCOUNTER
Received request via: Pharmacy    Was the patient seen in the last year in this department? Yes  LOV : 1/11/2024 - TELEMEDICINE   Does the patient have an active prescription (recently filled or refills available) for medication(s) requested? No    [facility]     Does the patient have [facility] Plus and need 100 day supply (blood pressure, diabetes and cholesterol meds only)? Patient does not have SCP

## 2024-04-02 RX ORDER — HYDROXYZINE HYDROCHLORIDE 25 MG/1
25-50 TABLET, FILM COATED ORAL NIGHTLY PRN
Qty: 60 TABLET | Refills: 3 | Status: SHIPPED | OUTPATIENT
Start: 2024-04-02

## 2024-06-11 ENCOUNTER — PATIENT OUTREACH (OUTPATIENT)
Dept: ONCOLOGY | Facility: MEDICAL CENTER | Age: 39
End: 2024-06-11
Payer: COMMERCIAL

## 2024-06-11 NOTE — PROGRESS NOTES
Lonnie called the [facility]N main line to ask about what to do for a suspicious looking mole on his skin. ONKARISSA spoke to patient about Dermatology for assessment and possible biopsy of said mole is the best place to start. MOE reached out to PCP Dr. Landers. He stated that they have been talking to Lonnie and he already has a dermatology referral. ONN called Lonnie back to update. [facility] number provided as [phone]. Unable to see referral in EPIC. Lonnie appreciative of call.

## 2024-06-12 ENCOUNTER — OFFICE VISIT (OUTPATIENT)
Dept: DERMATOLOGY | Facility: IMAGING CENTER | Age: 39
End: 2024-06-12
Payer: COMMERCIAL

## 2024-06-12 DIAGNOSIS — D48.5 NEOPLASM OF UNCERTAIN BEHAVIOR OF SKIN: ICD-10-CM

## 2024-06-12 PROCEDURE — 11302 SHAVE SKIN LESION 1.1-2.0 CM: CPT | Performed by: STUDENT IN AN ORGANIZED HEALTH CARE EDUCATION/TRAINING PROGRAM

## 2024-06-12 NOTE — PROGRESS NOTES
[facility] Dermatology Clinic Note    CC: mole check      HPI:  Lonnie Ennis is a 38 y.o. male who presents today as new patient for evaluation and management of    Notes growth on left shin, dark brown   Asymptomatic   People often tell him he should have it checked because of how it looks   No personal or family history of skin cancer   No other skin concerns today       ROS: no fevers/chills. Other pertinent positives and negatives as above.     Meds/PMH/PSH/FamHx/Allergies:   I have reviewed past medical history, surgical history, medications family history, allergies relevant to my specialty in the chart.       PHYSICAL EXAM:   A focused skin exam was completed of the face, back, abdomen, legs with the following pertinent findings listed below.   Remaining above-listed examined areas within normal limits / negative for rashes or lesions.    1.4 by 1.3 cm dark brown thin plaque with irregular borders on left shin - for biopsy  No similar lesions elsewhere   Scattered hypetrophic scars on back          IMPRESSION / PLAN:    Neoplasm of uncertain significance, n=1 - location/s: left shin  - differential diagnosis SK vs rule out MM   - discussed options today, proceed with biopsy. Further treatment may be recommended     Shave Biopsy Procedure Note:   Number of biopsies: 1  Location: as above   Risks, benefits and alternatives of procedure discussed, verbal consent obtained for photo (see chart) and informed consent obtained for procedure. Time out completed. Area of biopsy prepped with alcohol. Anesthesia with 1% lidocaine with epinephrine administered intradermally with 30 gauge needle. Shave biopsy of the site performed. Hemostasis achieved with pressure and aluminum chloride. Vaseline applied to wound with bandage. Patient tolerated procedure well and there were no complications. The specimen was sent to the pathology lab by the staff and patient will be contacted with results in 1-2 weeks. Further treatment may  be required. Wound care was discussed. Instructed to call clinic if patient experiences any complications such as post-operative bleeding, infection.         Follow up:  delonte Cohen MD   [facility]

## 2024-06-17 ENCOUNTER — PATIENT MESSAGE (OUTPATIENT)
Dept: DERMATOLOGY | Facility: IMAGING CENTER | Age: 39
End: 2024-06-17
Payer: COMMERCIAL

## 2024-06-17 NOTE — PROGRESS NOTES
[employer] message sent with results. Benign, no further treatment needed. Results to be scanned into media tab.

## 2024-09-03 DIAGNOSIS — F51.04 PSYCHOPHYSIOLOGICAL INSOMNIA: ICD-10-CM

## 2024-09-03 RX ORDER — HYDROXYZINE HYDROCHLORIDE 25 MG/1
25-50 TABLET, FILM COATED ORAL NIGHTLY PRN
Qty: 180 TABLET | Refills: 1 | Status: SHIPPED | OUTPATIENT
Start: 2024-09-03

## 2024-09-03 NOTE — TELEPHONE ENCOUNTER
Received request via: Pharmacy    Was the patient seen in the last year in this department? Yes1/11/24    Does the patient have an active prescription (recently filled or refills available) for medication(s) requested? No    [facility]    Does the patient have snf Plus and need 100-day supply? (This applies to ALL medications) Patient does not have SCP

## 2024-11-13 ENCOUNTER — APPOINTMENT (OUTPATIENT)
Dept: MEDICAL GROUP | Facility: LAB | Age: 39
End: 2024-11-13
Payer: COMMERCIAL

## 2025-01-04 DIAGNOSIS — F51.04 PSYCHOPHYSIOLOGICAL INSOMNIA: ICD-10-CM

## 2025-01-07 RX ORDER — HYDROXYZINE HYDROCHLORIDE 25 MG/1
25-50 TABLET, FILM COATED ORAL NIGHTLY PRN
Qty: 90 TABLET | Refills: 0 | Status: SHIPPED | OUTPATIENT
Start: 2025-01-07

## 2025-03-14 ENCOUNTER — OFFICE VISIT (OUTPATIENT)
Dept: MEDICAL GROUP | Facility: LAB | Age: 40
End: 2025-03-14
Payer: COMMERCIAL

## 2025-03-14 VITALS
HEART RATE: 67 BPM | DIASTOLIC BLOOD PRESSURE: 86 MMHG | TEMPERATURE: 98.7 F | OXYGEN SATURATION: 98 % | SYSTOLIC BLOOD PRESSURE: 130 MMHG | BODY MASS INDEX: 30.6 KG/M2 | WEIGHT: 206.6 LBS | RESPIRATION RATE: 16 BRPM | HEIGHT: 69 IN

## 2025-03-14 DIAGNOSIS — Z11.59 ENCOUNTER FOR HEPATITIS C SCREENING TEST FOR LOW RISK PATIENT: ICD-10-CM

## 2025-03-14 DIAGNOSIS — Z00.00 WELL ADULT EXAM: ICD-10-CM

## 2025-03-14 DIAGNOSIS — F51.04 PSYCHOPHYSIOLOGICAL INSOMNIA: ICD-10-CM

## 2025-03-14 DIAGNOSIS — F41.1 GAD (GENERALIZED ANXIETY DISORDER): ICD-10-CM

## 2025-03-14 PROCEDURE — 3075F SYST BP GE 130 - 139MM HG: CPT | Performed by: FAMILY MEDICINE

## 2025-03-14 PROCEDURE — 3079F DIAST BP 80-89 MM HG: CPT | Performed by: FAMILY MEDICINE

## 2025-03-14 PROCEDURE — 99214 OFFICE O/P EST MOD 30 MIN: CPT | Performed by: FAMILY MEDICINE

## 2025-03-14 RX ORDER — HYDROXYZINE HYDROCHLORIDE 25 MG/1
25-50 TABLET, FILM COATED ORAL NIGHTLY PRN
Qty: 30 TABLET | Refills: 3 | Status: SHIPPED | OUTPATIENT
Start: 2025-03-14

## 2025-03-14 RX ORDER — DESVENLAFAXINE 25 MG/1
1 TABLET, EXTENDED RELEASE ORAL DAILY
Qty: 30 TABLET | Refills: 1 | Status: SHIPPED | OUTPATIENT
Start: 2025-03-14

## 2025-03-14 ASSESSMENT — ANXIETY QUESTIONNAIRES
5. BEING SO RESTLESS THAT IT IS HARD TO SIT STILL: SEVERAL DAYS
4. TROUBLE RELAXING: NEARLY EVERY DAY
GAD7 TOTAL SCORE: 14
6. BECOMING EASILY ANNOYED OR IRRITABLE: NEARLY EVERY DAY
1. FEELING NERVOUS, ANXIOUS, OR ON EDGE: MORE THAN HALF THE DAYS
7. FEELING AFRAID AS IF SOMETHING AWFUL MIGHT HAPPEN: MORE THAN HALF THE DAYS
2. NOT BEING ABLE TO STOP OR CONTROL WORRYING: MORE THAN HALF THE DAYS
3. WORRYING TOO MUCH ABOUT DIFFERENT THINGS: SEVERAL DAYS

## 2025-03-14 ASSESSMENT — PATIENT HEALTH QUESTIONNAIRE - PHQ9: CLINICAL INTERPRETATION OF PHQ2 SCORE: 0

## 2025-03-14 ASSESSMENT — FIBROSIS 4 INDEX: FIB4 SCORE: 0.79

## 2025-03-14 NOTE — PROGRESS NOTES
"Subjective:     CC: Med refills, anxiety    HPI:   Lonnie presents today to discuss med refills and anxiety.  Needs a lot of hydroxyzine which she uses occasionally for insomnia.  Does not use nightly.  Also with recent anxiety, lots of trouble relaxing, constant worrying.  Has had return of panic attacks.  Lexapro was effective but he had severe GI side effects with persistent diarrhea.      Current Outpatient Medications   Medication Sig Dispense Refill    hydrOXYzine HCl (ATARAX) 25 MG Tab TAKE 1-2 TABLETS BY MOUTH AT BEDTIME AS NEEDED (SLEEP). 90 Tablet 0    escitalopram (LEXAPRO) 20 MG tablet Take 1 Tablet by mouth every day. 90 Tablet 1     No current facility-administered medications for this visit.       Medications, past medical history, allergies, and social history have been reviewed and updated.      Objective:       Exam:  /86 (BP Location: Left arm, Patient Position: Sitting, BP Cuff Size: Adult)   Pulse 67   Temp 37.1 °C (98.7 °F) (Temporal)   Resp 16   Ht 1.753 m (5' 9\")   Wt 93.7 kg (206 lb 9.6 oz)   SpO2 98%   BMI 30.51 kg/m²  Body mass index is 30.51 kg/m².    Constitutional: Alert. Well appearing. No distress.  Skin: Warm, dry, good turgor, no visible rashes.  Respiratory: Normal effort. Lungs are clear to auscultation bilaterally.  Cardiovascular: Regular rate and rhythm. Normal S1/S2. No murmurs, rubs or gallops.   Neuro: Moves all four extremities. No facial droop.  Psych: Answers questions appropriately. Normal affect and mood.    Assessment & Plan:     39 y.o. male with the following -     1. DIOGENES (generalized anxiety disorder)  Worsening recent symptoms after stopping Lexapro due to GI side effects.  Try Pristiq, follow-up 1 month.  Referral also placed for therapy.  - Desvenlafaxine Succinate ER 25 MG TABLET SR 24 HR; Take 1 Tablet by mouth every day.  Dispense: 30 Tablet; Refill: 1  - Referral to Behavioral Health    2. Psychophysiological insomnia  Continue intermittent " hydroxyzine use as needed.  - hydrOXYzine HCl (ATARAX) 25 MG Tab; Take 1-2 Tablets by mouth at bedtime as needed (sleep).  Dispense: 30 Tablet; Refill: 3    3. Well adult exam  - CBC WITH DIFFERENTIAL; Future  - Comp Metabolic Panel; Future  - HEMOGLOBIN A1C; Future  - Lipid Profile; Future  - TSH WITH REFLEX TO FT4; Future    4. Encounter for hepatitis C screening test for low risk patient  - HEP C VIRUS ANTIBODY; Future      Please note that this note was created using voice recognition software.

## 2025-03-20 NOTE — Clinical Note
REFERRAL APPROVAL NOTICE         Sent on March 19, 2025                   Lonnei Ennis  [address]  [place] [address]                   Dear Mr. Ennis,    After a careful review of the medical information and benefit coverage, [facility] has processed your referral. See below for additional details.    If applicable, you must be actively enrolled with your insurance for coverage of the authorized service. If you have any questions regarding your coverage, please contact your insurance directly.    REFERRAL INFORMATION   Referral #:  [id number]  Referred-To Department    Referred-By Provider:  Behavioral Health    Abdullahi Landers M.D.   Behavioral Health Outpatient      [address]  [place] [address]  [phone] [address]  [place] [id number] [phone]    Referral Start Date:  03/14/2025  Referral End Date:   03/14/2026             SCHEDULING  If you do not already have an appointment, please call [phone] to make an appointment.     MORE INFORMATION  If you do not already have a [employer] account, sign up at: [email or web address]  You can access your medical information, make appointments, see lab results, billing information, and more.  If you have questions regarding this referral, please contact  the [facility] Referrals department at:             [phone]. Monday - Friday 8:00AM - 5:00PM.     Sincerely,    [facility]

## 2025-04-06 DIAGNOSIS — F41.1 GAD (GENERALIZED ANXIETY DISORDER): ICD-10-CM

## 2025-04-07 RX ORDER — DESVENLAFAXINE 25 MG/1
1 TABLET, EXTENDED RELEASE ORAL DAILY
Qty: 90 TABLET | Refills: 3 | Status: SHIPPED | OUTPATIENT
Start: 2025-04-07

## 2025-04-10 ENCOUNTER — OFFICE VISIT (OUTPATIENT)
Dept: MEDICAL GROUP | Facility: LAB | Age: 40
End: 2025-04-10
Payer: COMMERCIAL

## 2025-04-10 VITALS
HEART RATE: 81 BPM | DIASTOLIC BLOOD PRESSURE: 70 MMHG | BODY MASS INDEX: 29.71 KG/M2 | SYSTOLIC BLOOD PRESSURE: 110 MMHG | WEIGHT: 200.6 LBS | RESPIRATION RATE: 14 BRPM | HEIGHT: 69 IN | OXYGEN SATURATION: 99 % | TEMPERATURE: 98.1 F

## 2025-04-10 DIAGNOSIS — F41.1 GAD (GENERALIZED ANXIETY DISORDER): ICD-10-CM

## 2025-04-10 DIAGNOSIS — R73.03 PREDIABETES: ICD-10-CM

## 2025-04-10 DIAGNOSIS — E78.5 HYPERLIPIDEMIA, UNSPECIFIED HYPERLIPIDEMIA TYPE: ICD-10-CM

## 2025-04-10 DIAGNOSIS — F51.04 PSYCHOPHYSIOLOGICAL INSOMNIA: ICD-10-CM

## 2025-04-10 PROCEDURE — 3074F SYST BP LT 130 MM HG: CPT | Performed by: FAMILY MEDICINE

## 2025-04-10 PROCEDURE — 3078F DIAST BP <80 MM HG: CPT | Performed by: FAMILY MEDICINE

## 2025-04-10 PROCEDURE — 99214 OFFICE O/P EST MOD 30 MIN: CPT | Performed by: FAMILY MEDICINE

## 2025-04-10 ASSESSMENT — ANXIETY QUESTIONNAIRES
4. TROUBLE RELAXING: NEARLY EVERY DAY
7. FEELING AFRAID AS IF SOMETHING AWFUL MIGHT HAPPEN: SEVERAL DAYS
3. WORRYING TOO MUCH ABOUT DIFFERENT THINGS: MORE THAN HALF THE DAYS
2. NOT BEING ABLE TO STOP OR CONTROL WORRYING: MORE THAN HALF THE DAYS
GAD7 TOTAL SCORE: 12
5. BEING SO RESTLESS THAT IT IS HARD TO SIT STILL: SEVERAL DAYS
6. BECOMING EASILY ANNOYED OR IRRITABLE: MORE THAN HALF THE DAYS
1. FEELING NERVOUS, ANXIOUS, OR ON EDGE: SEVERAL DAYS

## 2025-04-10 ASSESSMENT — FIBROSIS 4 INDEX: FIB4 SCORE: 0.79

## 2025-04-10 NOTE — PROGRESS NOTES
"Subjective:     CC: Follow up    HPI:   Lonnie presents today to follow-up on anxiety and insomnia.  Pristiq started at last visit.    Some GI upset and fatigue with Pristiq but overall improved and has noticed this is helping.  High stress has worsened anxiety with possible job loss. Anxiety more at times when not busy.  Still using hydroxyzine for sleep and this is working well.  Has not yet followed up on behavioral health referral but has made healthy lifestyle changes.    Current Outpatient Medications   Medication Sig Dispense Refill    Desvenlafaxine Succinate ER 25 MG TABLET SR 24 HR TAKE 1 TABLET BY MOUTH EVERY DAY 90 Tablet 3    hydrOXYzine HCl (ATARAX) 25 MG Tab Take 1-2 Tablets by mouth at bedtime as needed (sleep). 30 Tablet 3     No current facility-administered medications for this visit.       Medications, past medical history, allergies, and social history have been reviewed and updated.      Objective:       Exam:  /70 (BP Location: Left arm, Patient Position: Sitting, BP Cuff Size: Adult)   Pulse 81   Temp 36.7 °C (98.1 °F) (Temporal)   Resp 14   Ht 1.753 m (5' 9\")   Wt 91 kg (200 lb 9.6 oz)   SpO2 99%   BMI 29.62 kg/m²  Body mass index is 29.62 kg/m².    Constitutional: Alert. Well appearing. No distress.  Skin: Warm, dry, good turgor, no visible rashes.  Respiratory: Normal effort.   Neuro: Moves all four extremities. No facial droop.  Psych: Answers questions appropriately. Normal affect and mood.    Assessment & Plan:     39 y.o. male with the following -     1. DIOGENES (generalized anxiety disorder)  Improved with Pristiq with initial but now improved side effects.  Continue Pristiq 25 mg daily.  Recommended following up on the behavioral health referral for therapy.    2. Psychophysiological insomnia  Continue hydroxyzine as needed, sleep hygiene measures.    3. Hyperlipidemia, unspecified hyperlipidemia type  4. Prediabetes  Repeat labs pending, continue with diet and lifestyle " measures.      Please note that this note was created using voice recognition software.

## 2025-04-11 DIAGNOSIS — F51.04 PSYCHOPHYSIOLOGICAL INSOMNIA: ICD-10-CM

## 2025-04-11 RX ORDER — HYDROXYZINE HYDROCHLORIDE 25 MG/1
25-50 TABLET, FILM COATED ORAL NIGHTLY PRN
Qty: 90 TABLET | Refills: 2 | Status: SHIPPED | OUTPATIENT
Start: 2025-04-11

## 2025-04-11 NOTE — TELEPHONE ENCOUNTER
Received request via: Pharmacy    Was the patient seen in the last year in this department? Yes    Does the patient have an active prescription (recently filled or refills available) for medication(s) requested? No    Pharmacy Name: [facility]     Does the patient have [facility] and need 100-day supply? (This applies to ALL medications) Patient does not have SCP

## 2025-04-18 ENCOUNTER — HOSPITAL ENCOUNTER (OUTPATIENT)
Dept: LAB | Facility: MEDICAL CENTER | Age: 40
End: 2025-04-18
Attending: FAMILY MEDICINE
Payer: COMMERCIAL

## 2025-04-18 DIAGNOSIS — Z11.59 ENCOUNTER FOR HEPATITIS C SCREENING TEST FOR LOW RISK PATIENT: ICD-10-CM

## 2025-04-18 DIAGNOSIS — Z00.00 WELL ADULT EXAM: ICD-10-CM

## 2025-04-18 LAB
BASOPHILS # BLD AUTO: 0.6 % (ref 0–1.8)
BASOPHILS # BLD: 0.02 K/UL (ref 0–0.12)
EOSINOPHIL # BLD AUTO: 0.04 K/UL (ref 0–0.51)
EOSINOPHIL NFR BLD: 1.2 % (ref 0–6.9)
ERYTHROCYTE [DISTWIDTH] IN BLOOD BY AUTOMATED COUNT: 42.2 FL (ref 35.9–50)
EST. AVERAGE GLUCOSE BLD GHB EST-MCNC: 134 MG/DL
HBA1C MFR BLD: 6.3 % (ref 4–5.6)
HCT VFR BLD AUTO: 47.5 % (ref 42–52)
HGB BLD-MCNC: 15.8 G/DL (ref 14–18)
IMM GRANULOCYTES # BLD AUTO: 0 K/UL (ref 0–0.11)
IMM GRANULOCYTES NFR BLD AUTO: 0 % (ref 0–0.9)
LYMPHOCYTES # BLD AUTO: 1.63 K/UL (ref 1–4.8)
LYMPHOCYTES NFR BLD: 47.1 % (ref 22–41)
MCH RBC QN AUTO: 28.8 PG (ref 27–33)
MCHC RBC AUTO-ENTMCNC: 33.3 G/DL (ref 32.3–36.5)
MCV RBC AUTO: 86.5 FL (ref 81.4–97.8)
MONOCYTES # BLD AUTO: 0.29 K/UL (ref 0–0.85)
MONOCYTES NFR BLD AUTO: 8.4 % (ref 0–13.4)
NEUTROPHILS # BLD AUTO: 1.48 K/UL (ref 1.82–7.42)
NEUTROPHILS NFR BLD: 42.7 % (ref 44–72)
NRBC # BLD AUTO: 0 K/UL
NRBC BLD-RTO: 0 /100 WBC (ref 0–0.2)
PLATELET # BLD AUTO: 221 K/UL (ref 164–446)
PMV BLD AUTO: 9.7 FL (ref 9–12.9)
RBC # BLD AUTO: 5.49 M/UL (ref 4.7–6.1)
WBC # BLD AUTO: 3.5 K/UL (ref 4.8–10.8)

## 2025-04-18 PROCEDURE — 83036 HEMOGLOBIN GLYCOSYLATED A1C: CPT

## 2025-04-18 PROCEDURE — 85025 COMPLETE CBC W/AUTO DIFF WBC: CPT

## 2025-04-18 PROCEDURE — 80061 LIPID PANEL: CPT

## 2025-04-18 PROCEDURE — 80053 COMPREHEN METABOLIC PANEL: CPT

## 2025-04-18 PROCEDURE — 84443 ASSAY THYROID STIM HORMONE: CPT

## 2025-04-18 PROCEDURE — 36415 COLL VENOUS BLD VENIPUNCTURE: CPT

## 2025-04-18 PROCEDURE — 86803 HEPATITIS C AB TEST: CPT

## 2025-04-19 LAB
ALBUMIN SERPL BCP-MCNC: 4.7 G/DL (ref 3.2–4.9)
ALBUMIN/GLOB SERPL: 1.5 G/DL
ALP SERPL-CCNC: 46 U/L (ref 30–99)
ALT SERPL-CCNC: 26 U/L (ref 2–50)
ANION GAP SERPL CALC-SCNC: 9 MMOL/L (ref 7–16)
AST SERPL-CCNC: 33 U/L (ref 12–45)
BILIRUB SERPL-MCNC: 0.9 MG/DL (ref 0.1–1.5)
BUN SERPL-MCNC: 11 MG/DL (ref 8–22)
CALCIUM ALBUM COR SERPL-MCNC: 9 MG/DL (ref 8.5–10.5)
CALCIUM SERPL-MCNC: 9.6 MG/DL (ref 8.5–10.5)
CHLORIDE SERPL-SCNC: 102 MMOL/L (ref 96–112)
CHOLEST SERPL-MCNC: 214 MG/DL (ref 100–199)
CO2 SERPL-SCNC: 26 MMOL/L (ref 20–33)
CREAT SERPL-MCNC: 1.02 MG/DL (ref 0.5–1.4)
GFR SERPLBLD CREATININE-BSD FMLA CKD-EPI: 96 ML/MIN/1.73 M 2
GLOBULIN SER CALC-MCNC: 3.1 G/DL (ref 1.9–3.5)
GLUCOSE SERPL-MCNC: 89 MG/DL (ref 65–99)
HCV AB SER QL: NORMAL
HDLC SERPL-MCNC: 62 MG/DL
LDLC SERPL CALC-MCNC: 137 MG/DL
POTASSIUM SERPL-SCNC: 4.8 MMOL/L (ref 3.6–5.5)
PROT SERPL-MCNC: 7.8 G/DL (ref 6–8.2)
SODIUM SERPL-SCNC: 137 MMOL/L (ref 135–145)
TRIGL SERPL-MCNC: 76 MG/DL (ref 0–149)
TSH SERPL DL<=0.005 MIU/L-ACNC: 0.79 UIU/ML (ref 0.38–5.33)

## 2025-04-22 ENCOUNTER — RESULTS FOLLOW-UP (OUTPATIENT)
Dept: MEDICAL GROUP | Facility: LAB | Age: 40
End: 2025-04-22
Payer: COMMERCIAL

## 2025-04-28 DIAGNOSIS — F41.9 ANXIETY AND DEPRESSION: ICD-10-CM

## 2025-04-28 DIAGNOSIS — F32.A ANXIETY AND DEPRESSION: ICD-10-CM

## 2025-04-29 RX ORDER — ALPRAZOLAM 0.5 MG
0.5 TABLET ORAL
Qty: 10 TABLET | Refills: 0 | Status: SHIPPED | OUTPATIENT
Start: 2025-04-29 | End: 2025-05-29

## 2025-06-30 ENCOUNTER — OFFICE VISIT (OUTPATIENT)
Dept: BEHAVIORAL HEALTH | Facility: CLINIC | Age: 40
End: 2025-06-30
Payer: COMMERCIAL

## 2025-06-30 ENCOUNTER — DOCUMENTATION (OUTPATIENT)
Dept: BEHAVIORAL HEALTH | Facility: CLINIC | Age: 40
End: 2025-06-30
Payer: COMMERCIAL

## 2025-06-30 VITALS — HEART RATE: 80 BPM | SYSTOLIC BLOOD PRESSURE: 114 MMHG | DIASTOLIC BLOOD PRESSURE: 62 MMHG | OXYGEN SATURATION: 100 %

## 2025-06-30 DIAGNOSIS — F33.1 MDD (MAJOR DEPRESSIVE DISORDER), RECURRENT EPISODE, MODERATE (HCC): Primary | ICD-10-CM

## 2025-06-30 DIAGNOSIS — F51.04 PSYCHOPHYSIOLOGICAL INSOMNIA: ICD-10-CM

## 2025-06-30 DIAGNOSIS — F41.1 GAD (GENERALIZED ANXIETY DISORDER): ICD-10-CM

## 2025-06-30 DIAGNOSIS — F42.2 MIXED OBSESSIONAL THOUGHTS AND ACTS: ICD-10-CM

## 2025-06-30 PROCEDURE — 99205 OFFICE O/P NEW HI 60 MIN: CPT | Performed by: STUDENT IN AN ORGANIZED HEALTH CARE EDUCATION/TRAINING PROGRAM

## 2025-06-30 PROCEDURE — 3074F SYST BP LT 130 MM HG: CPT | Performed by: STUDENT IN AN ORGANIZED HEALTH CARE EDUCATION/TRAINING PROGRAM

## 2025-06-30 PROCEDURE — 3078F DIAST BP <80 MM HG: CPT | Performed by: STUDENT IN AN ORGANIZED HEALTH CARE EDUCATION/TRAINING PROGRAM

## 2025-06-30 PROCEDURE — 99417 PROLNG OP E/M EACH 15 MIN: CPT | Performed by: STUDENT IN AN ORGANIZED HEALTH CARE EDUCATION/TRAINING PROGRAM

## 2025-06-30 RX ORDER — DESVENLAFAXINE 50 MG/1
50 TABLET, FILM COATED, EXTENDED RELEASE ORAL DAILY
Qty: 30 TABLET | Refills: 3 | Status: SHIPPED | OUTPATIENT
Start: 2025-06-30

## 2025-06-30 RX ORDER — PROPRANOLOL HYDROCHLORIDE 10 MG/1
10 TABLET ORAL 3 TIMES DAILY PRN
Qty: 90 TABLET | Refills: 1 | Status: SHIPPED | OUTPATIENT
Start: 2025-06-30

## 2025-06-30 ASSESSMENT — ANXIETY QUESTIONNAIRES
GAD7 TOTAL SCORE: 19
5. BEING SO RESTLESS THAT IT IS HARD TO SIT STILL: MORE THAN HALF THE DAYS
4. TROUBLE RELAXING: NEARLY EVERY DAY
1. FEELING NERVOUS, ANXIOUS, OR ON EDGE: MORE THAN HALF THE DAYS
2. NOT BEING ABLE TO STOP OR CONTROL WORRYING: NEARLY EVERY DAY
IF YOU CHECKED OFF ANY PROBLEMS ON THIS QUESTIONNAIRE, HOW DIFFICULT HAVE THESE PROBLEMS MADE IT FOR YOU TO DO YOUR WORK, TAKE CARE OF THINGS AT HOME, OR GET ALONG WITH OTHER PEOPLE: SOMEWHAT DIFFICULT
3. WORRYING TOO MUCH ABOUT DIFFERENT THINGS: NEARLY EVERY DAY
7. FEELING AFRAID AS IF SOMETHING AWFUL MIGHT HAPPEN: NEARLY EVERY DAY
6. BECOMING EASILY ANNOYED OR IRRITABLE: NEARLY EVERY DAY

## 2025-06-30 ASSESSMENT — PATIENT HEALTH QUESTIONNAIRE - PHQ9
CLINICAL INTERPRETATION OF PHQ2 SCORE: 3
SUM OF ALL RESPONSES TO PHQ QUESTIONS 1-9: 18
5. POOR APPETITE OR OVEREATING: 3 - NEARLY EVERY DAY

## 2025-06-30 NOTE — PATIENT INSTRUCTIONS
[email or web address]    [email or web address]    Therapy in a Nutshell    Crisis Plan:  Calling clinic. Calling a 24-hour crisis hotline number 988. Calling 911. Utilizing the ED in the event of an emergency.     1-800-QUIT-NOW (984-192-8746) or enroll online at [email or web address]    [place]    [place]    [facility]  Address: [address] [place] [address]  Phone: [phone]  Notes: [facility] is a consortium of private practitioners who advertise together, and are in business separately as the following: Ivy Rees, Marriage and Family Therapist, Licensed Clinical Alcohol & Drug Counselor; Rita Santizo, [facility] Practitioner; Aruna Cummings, Occupational Therapist and Reiki Master; Heather Ruffin, Marriage and Family Therapist  [facility]  Address: [address] [place] [address] [place] [address]  Phone: [phone]  Notes: ADHD skills building, ACT Therapy, CBT, EMDR, Trauma focused therapy, Play based therapy, Family therapy  [facility]  Address: [address], [place] [address]  Phone: [phone]  Notes: Depression, Anxiety and stress management, marriage and family, DBT, EMDR  [facility]  Address: [address] [place] [address]  Phone: [phone]  Notes: wellness, mindfulness, self-care, LGBTQ+, goal setting and achievement   [facility]  Address: [address], [place] [address]  Phone: [phone]  Notes: Cognitive Behavioral Therapy (CBT), Dialectical Behavioral Therapy (DBT), Emotionally Focused Therapy (EFT), Gottman Couples Therapy, Positive Discipline and Child Education, Play Therapy  [facility]  Address: [address] [address]  Phone: [phone]  Notes: substance abuse, domestic violence, anger management, marriage and family counseling, mindfulness as well as Substance Abuse and Anger Management/Domestic Violence  Groups              [facility]: Ivy Coffey Psy.D, Magui Bustos LCSW, Page Gary, PhD  Address: [address] [facility] [address]  Phone: [phone]  Notes: sleep disorders and health related concerns such as chronic pain, depression and anxiety  Tony Ledbetter, Ph.D at [facility]  Address: [address], [place] [address]  Phone: [phone]  Notes: sport psychology, resilience, motivation, self-talk, confidence  [facility]  Address: [address], [address], [facility], [address]  Phone: [phone]  Notes: Certified [facility] ([facility]), offering peer services, case management, crisis intervention, Basic Skills Training, and Psycho-Social Rehabilitation; provides MAT for adolescent and adult opioid users, family therapy   [facility]  Address: [address] [place] [address]  Phone: [phone]  Notes: Many group class options, Domestic Violence, Anger Management, Georgian speaking, Substance Abuse, Peaceful families parenting, Family therapy, Sliding scale fees  [facility]  Address: [address] [place] [address]  Phone: [phone]   Notes: EMDR, Georgian speaking, Psychedelic assisted therapy, couples and family counseling  [facility]  Address: [address] [place] [address]  Phone: [phone]  Notes: Testing: Cognitive evaluations, Learning disability evaluations, ADHD evaluations, Pre-employment evaluations, Fitness for duty evaluations, Bariatric surgery evaluations, Pain procedure evaluations  [facility]  Address: [address], [place] [address]  Phone: [phone]  Notes: Borderline Personality Disorder, EMDR, Grief Counseling, Couples/family/parenting       Sleep Hygiene Recommendations:    Good sleep hygiene is an important basic treatment element for sleep disorders regardless  of the cause. Sleep hygiene education (outlined below) provides information about lifestyle and environmental factors that may help or hinder sleep. Sleep hygiene provides an essential foundation for other management approaches, but is not a sufficient treatment for insomnia on its own.    * Maintain a regular bedtime and wakeup schedule, even on weekends and days off. This is one of the most important ways to train your body to know when to sleep, and a regular routine helps regulate your body's internal clock.    * Make the last hour before bed a “wind-down” time. Have a light carbohydrate snack (e.g., crackers, bread, cereal) during this time. Don't engage in activities that are stimulating or mentally active (i.e. watching drama/thriller movie, discussion about charged topics such as finances, etc).    * Eat regular meals every day. Regularity in meals will also help to regulate your internal body clock.    * Limit liquid consumption to 8-10 oz in the evening.    * Avoid caffeinated products for several hours before bedtime. Remember even decaffeinated drinks have caffeine in them. It also takes longer to break down caffeine when you get older, so even 1 cup of coffee might linger around for longer.    * Do not consume alcohol too close to bedtime. When alcohol gets metabolized at night, this can actually lead to worsened sleep.    * Make sure your sleeping conditions, including your bed, are comfortable as possible. Wear loose fitting clothes if possible. Your room should be dark and quiet and minimize ambient light and sounds. If you are sharing a bed with a snoring, cover-stealing, or restless partner, make separate, temporary sleeping arrangements until you reestablish a satisfactory sleeping pattern.    * The temperature of your bedroom should be comfortable and on the cool side (around 65-68°F).    * Exercise regularly, but do not engage in activities that raise body temperature (e.g., warm baths, aerobic  activity) within 1.5 hours of bedtime.    * If you can’t sleep, get up and pursue some relaxing activity, such as reading or knitting, until you feel sleepy, do not lie in bed worrying about getting to sleep.    Taking medications to help with sleep:    Most authorities on sleep recommend against use of sedative drugs by these people for the following reasons:    Sedatives modify nervous system activity during sleep: for example, they may reduce the normal period of dreaming. After taking sedatives for a while and then stopping, many people report they have sleep-disrupting dreams, which cause them to wake up feeling tired even after a full night’s sleep.    The human body develops tolerance to sedatives after their repeated use. After a while, you have to take more and more sedatives to make you feel sleepy. A person can become psychologically dependent on sleeping preparations; if you are convinced that’s the only way you can get a good night’s sleep, you won’t be able to go to sleep without a drug.    Hypnotic / sedative medications should be used on a short-term basis, as a bridge to more long-term sustainable behavioral treatment (I.e. CBT-I therapy, sleep hygiene modification, etc).    Online CBT-I (cognitive behavioral therapy for insomnia) Resources:    https://www.ptsd.va.gov/appvid/mobile/insomnia_coach.asp  CBT-I kristan       Insomnia Treatment Options     CBT-Insomnia (#1 Treatment recommendation)  - Cognitive Behavioral Therapy (CBT) for insomnia has been found to be effective at promoting improved, healthy sleep.  - Unfortunately, we do not have enough psychologists providing this excellent treatment for insomnia but something you can do on your own.  Say Brice To Insomnia by Greg Khan. This a book where you learn about sleep and how sleep medicines actually do not help and then guides you through a 6 weeks of CBT for isomnia.        For Further Information     - I recommend the website:  https://sleepfoundation.org  - You can learn all about sleep and its health benefits as well as tips and products to help promote healthy sleep               Domestic Violence Resources            Homelessness Resources          Medical Resources          Housing Resources                Food Pantries        Postpartum support and resources

## 2025-06-30 NOTE — PROGRESS NOTES
INITIAL PSYCHIATRIC EVALUATION      This provider informed the patient their medical records are totally confidential except for the use by other providers involved in their care, or if the patient signs a release, or to report instances of child or elder abuse, or if it is determined they are an immediate risk to harm themselves or others.     Patient: Lonnie Ennis     Date: 2025       MR#: [id number]   : 1985          IDENTIFYING INFORMATION:  This is a 39 y.o. old male who presents for an initial evaluation.   Persons in attendance: Patient    CHIEF COMPLAINT:  ***     REFERRAL SOURCE: ***By PCP     HPI:  ***     Mr. Ennis is a 40 y/o M with pmh of  has a past medical history of Anxiety, Depression, Headache(784.0), Hyperlipemia, and Hypertension.    Per chart review patient had been seen by PCP on 2016 due to worsening mood and anxiety symptoms, had been placed on Lexapro 5 mg, as well as short course of Xanax.,  Patient was seen for follow-up on 2016 and his Lexapro was increased to 10 mg in the context of stressor of  from his wife.  Patient was subsequently seen on 2016 in regards to anxiety symptoms as well as dealing with stressor of his grandfather and his health concerns.  Patient at this appointment had been increased on Lexapro to 20 mg.  Patient had been subsequently seen in 2016 and continued on Xanax 0.5 mg increased from twice daily as needed to 3 times daily as needed.  Patient was subsequently seen by PCP on 2016 and transition to Celexa 20 mg.    Patient had establish with new PCP on 2019, lost insurance, this appointment I discussed getting back on medications due to the patient's mood and anxiety symptoms as well as previous history of OCD.  Patient had been prescribed Celexa 20 mg as well as trazodone 50 mg nightly as needed.  Patient was subsequently seen by different PCP, on 2021 and then patient was transitioned back to Lexapro 10  mg, as well as given short course of Valium 2 mg.  Patient was later seen by PCP on 1/7/2022, and patient had requested to be switched back to Xanax given short supply of Xanax.  Patient was subsequently seen on 12/1/2022, increased on Lexapro from 10 mg to 20 mg, as well as discussed the association of addiction with this medication with hopes to taper and completely stop the use of Xanax.    Patient had been seen by new PCP on 6/21/2023, patient had been continued on Lexapro 20 mg as well as have been given hydroxyzine 25 mg to help with sleep.  Patient had been subsequently seen on 3/14/2025, by PCP and noted struggles with insomnia as well as anxiety, reported that Lexapro had been effective however had severe side effects in regards to persistent diarrhea, therefore patient has been transition to Pristiq for 1 month, as well as refills on hydroxyzine.  Patient is referred to behavioral health for therapy.          Current stressors include ***.  Social support from ***     Review of Systems:   Positive Symptoms in ROS highlighted in Bold   Constitutional: Fever, major weight changes   Neuro: HA, light headedness, changes in vision, dizziness, numbness/tingling, new focal weakness, or abnormal movements   Respiratory: shortness of breath, no cough   Cardiac: chest pain, no palpitations   GI: abdominal pain, nausea, vomiting, diarrhea, and constipation.   MSK: pain in extremities   Skin: rash   Psych: As per HPI     PAST PSYCHIATRIC HISTORY:   Past Psychiatrist or Therapist:  Reports did see a therapist in 2016 in context for divorce for 4 months.    Past Medications: lexapro-still GI issues, celexa-GI issues, Xanax-worked well, Valium-worked well; currently on Pristiq   Past Diagnosis: denies   Inpatient Psychiatric Hospitalizations: denies    Contemplated suicide: denies    Suicide attempts: denies    Homicidal thoughts: denies    Self Injury/High risk behavior: denies      SOCIAL HISTORY:   Description of  "childhood (born, siblings, raised): born in [facility], reports raised in multiple states, mostly in [place] Reports adopted as an infant. Reports that childhood was \"perfect.\" Reports that youngest of 6 adopted children. Siblings- no biological relation, 5 siblings.  History of physical, verbal, sexual abuse: denies   Marital history: 1xmarried and then  in 2016, currently in relationship  for 6 years.    Children: 2 children, 2 daughters twins 10 years shared custody   Education: college associates in business administration and working on bachelors and masters   Occupation: working at [email or web address] works as  for 3 weeks now.    Disability: denies   Current living situation: lives in [facility] in [place], with girlfriend on and off and 2 children, and 1 dog   Legal history: denies    history: denies    Sabianism affiliation: Spiritual    Access to firearms: denies     SUBSTANCE ABUSE HISTORY:   Nicotine: 1x per week 3mg pouch    Alcohol: on special occasions, 1 beer 1x per month   Cannabis: denies   Cocaine: denies   Heroin/ Narcotic Pain Medications: denies   Other: denies   Patient denies going to detox/rehab.     Patient admits having legal charges associated with drugs or alcohol. 2016 DUI and reports sleeping in car.     [place]  records   reviewed.  No concerns about misuse of controlled substance.    FAMILY PSYCHIATRIC HISTORY:   Family member with psychiatric or mental illness: denies   Suicides or attempts:  denies    Substance abuse:  denies      Allergies:  Allergies[1]     EXAM     PHYSICAL EXAMINATION  Vital signs: There were no vitals taken for this visit.  Musculoskeletal: Gait is { Gait:27776}.   Abnormal movements:  No gross abnormalities noted.***    MENTAL STATUS EXAMINATION    General: appears { MSE Stated Age:87498} and exhibits grooming which is { MSE Groomin}.  Hygiene is ***.     Behavior: Pt is {[facility] BEHAVIOR:54065}.  *** " "apparent distress.  Eye contact is {[facility] Eye contact:97978}  Psychomotor: Psychomotor agitation or retardation *** noted.  Tics or tremors *** noted.  Speech: {[facility] speech:30963}  Mood: \"***\"  Affect: {[facility] AFFECT:67998987},  Thought Process: {[facility] THOUGHT PROCESS:00277846}  Thought Content: { endorses/denies:48947} suicidal ideation, { endorses/denies:29414} homicidal ideation. {[facility] THOUGHT CONTENT:63982612}  Perception: { endorses/denies:76669} auditory hallucinations, { endorses/denies:52534} visual hallucinations. No delusions noted on interview.    Insight: {GOOD/ADEQUATE/LIMITED/POOR:68187077}  Judgment: {GOOD/ADEQUATE/LIMITED/POOR:47781885}  Cognition: ***Grossly Intact  Orientation: Oriented to {orientationmp:49806}  Memory: {[facility] MEMORY:80013348}   Attention & Concentration: {attentionconcentrationmp:90953}  Language: grossly intact  Abstraction: not formally assessed  General Fund of Knowledge: not formally assessed  Clock Drawing: Not performed     LABS:  Lab Results   Component Value Date/Time    CHOLSTRLTOT 214 (H) 04/18/2025 11:10 AM    TRIGLYCERIDE 76 04/18/2025 11:10 AM    HDL 62 04/18/2025 11:10 AM     (H) 04/18/2025 11:10 AM     Lab Results   Component Value Date/Time    SODIUM 137 04/18/2025 11:10 AM    POTASSIUM 4.8 04/18/2025 11:10 AM    CHLORIDE 102 04/18/2025 11:10 AM    CO2 26 04/18/2025 11:10 AM    ANION 9.0 04/18/2025 11:10 AM    GLUCOSE 89 04/18/2025 11:10 AM    BUN 11 04/18/2025 11:10 AM    CREATININE 1.02 04/18/2025 11:10 AM    CALCIUM 9.6 04/18/2025 11:10 AM    ASTSGOT 33 04/18/2025 11:10 AM    ALTSGPT 26 04/18/2025 11:10 AM    TBILIRUBIN 0.9 04/18/2025 11:10 AM    ALBUMIN 4.7 04/18/2025 11:10 AM    TOTPROTEIN 7.8 04/18/2025 11:10 AM    GLOBULIN 3.1 04/18/2025 11:10 AM    AGRATIO 1.5 04/18/2025 11:10 AM     Lab Results   Component Value Date/Time    WBC 3.5 (L) 04/18/2025 11:10 AM    RBC 5.49 04/18/2025 11:10 AM    HEMOGLOBIN 15.8 04/18/2025 11:10 AM    HEMATOCRIT 47.5 " "04/18/2025 11:10 AM    MCV 86.5 04/18/2025 11:10 AM    MCH 28.8 04/18/2025 11:10 AM    MCHC 33.3 04/18/2025 11:10 AM    RDW 42.2 04/18/2025 11:10 AM    PLATELETCT 221 04/18/2025 11:10 AM    MPV 9.7 04/18/2025 11:10 AM    NEUTSPOLYS 42.70 (L) 04/18/2025 11:10 AM    LYMPHOCYTES 47.10 (H) 04/18/2025 11:10 AM    MONOCYTES 8.40 04/18/2025 11:10 AM    EOSINOPHILS 1.20 04/18/2025 11:10 AM    BASOPHILS 0.60 04/18/2025 11:10 AM    IMMGRAN 0.00 04/18/2025 11:10 AM    NRBC 0.00 04/18/2025 11:10 AM    NEUTS 1.48 (L) 04/18/2025 11:10 AM    MONOS 0.29 04/18/2025 11:10 AM    EOS 0.04 04/18/2025 11:10 AM    BASO 0.02 04/18/2025 11:10 AM    IMMGRANAB 0.00 04/18/2025 11:10 AM    NRBCAB 0.00 04/18/2025 11:10 AM     Lab Results   Component Value Date/Time    HBA1C 6.3 (H) 04/18/2025 1110    AVGLUC 134 04/18/2025 1110     Lab Results   Component Value Date/Time    TSHULTRASEN 0.786 04/18/2025 1110     No results found for: \"FREET4\"  No results found for: \"25HYDROXY\"    PREVENTATIVE CARE  {Medication Monitoring (Optional):96101}       SAFETY ASSESSMENT/RISK ASSESSMENTS      SAFETY ASSESSMENT - SELF:    Does patient acknowledge current or past symptoms of dangerousness to self? {YES/NO:200010}  History of suicide by family member: {YES/NO:200010}  History of suicide by friend/significant other: {YES/NO:200010}  Recent change in amount/specificity/intensity of suicidal thoughts or self-harm behavior? {YES/NO:200010}  Current access to firearms, medications, or other identified means of suicide/self-harm? {YES/NO:200010}  If yes, willing to restrict access to means of suicide/self-harm? {YES/NO:200010}  Protective factors present:{protectivefactors:74885}     SAFETY ASSESSMENT - OTHERS:    Does patient acknowledge current or past symptoms of aggressive behavior or risk to others? {YES/NO:200010}  Recent change in amount/specificity/intensity of thoughts or threats to harm others? {YES/NO:200010}  Current access to firearms/other identified " means of harm? {YES/NO:200010}  If yes, willing to restrict access to weapons/means of harm? {YES/NO:200010}     CURRENT RISK:  Though it is impossible to accurately predict with absolute certainty future events and human behaviors, an assessment of current risk factors and protective factors suggests that this patient's:       Suicidal: {RB RATINGS:91313766}       Homicidal: {RBH RATINGS:07427806}       Self-Harm: {RBH RATINGS:78385843}       Relapse: {RB RATINGS:87495254}       Crisis Safety Plan Reviewed {YES/NO/NOT INDICATED:55464}    Suicide Risk Assessment (Acute and Chronic):  Risk factors: {suiciderisk factors:57648}  Protective Factors: {protectivefactors:55459}  Though it is impossible to accurately predict with absolute certainty future events and human behaviors, an assessment of current suicidal indicators, risk factors, and protective factors suggests that this patient's:  Acute Suicide Risk: {DESC; LOW/MODERATE/HIGH:20165}. -as stated above / increases with substance abuse.  Chronic Suicide Risk: {DESC; LOW/MODERATE/HIGH:20165} - as stated above / increases with substance abuse.      VIOLENCE RISK ASSESSMENT:   Chronic: {violencechronicriskfactors:51254}  Acute: {violenceacuteriskfactorsmp:01158}  Though it is impossible to accurately predict with absolute certainty future events and human behaviors, an assessment of current risk factors and protective factors suggests that this patient's:  >Acute Risk of Violence is deemed {Desc; low/moderate/high:894442}  >Chronic Risk of Violence is deemed {Desc; low/moderate/high:912690}       ASSESSMENT AND TREATMENT PLAN      DSM 5 Diagnosis: ***   NonPsychiatric Diagnosis: ***        SCREENINGS:      1/7/2022    11:00 AM 1/3/2023     5:00 PM 3/14/2025     7:20 AM   Depression Screen (PHQ-2/PHQ-9)   PHQ-2 Total Score 0 0 0     Interpretation of PHQ-9 Total Score   Score Severity   1-4 No Depression   5-9 Mild Depression   10-14 Moderate Depression   15-19  Moderately Severe Depression   20-27 Severe Depression         3/14/2025     7:34 AM 4/10/2025     7:21 AM   DIOGENES 7   DIOGENES-7 Total Score 14 12     Interpretation of DIOGENES 7 Total Score   Score Severity:  0-4 No Anxiety   5-9 Mild Anxiety  10-14 Moderate Anxiety  15-21 Severe Anxiety        Problem 1: ***   Comment: ***   Plan (include new prescriptions or refills): ***   -Recommend updated labs including CMP, CBC, thyroid panel, Vit D, Vit B12, folate, thiamine; lipid panel, lithium level/Valproic acid level  --Discussed the risks, benefits, alternatives associated with medications, patient verbalized understanding and denied any further questions or concerns and was in agreement with the treatment plan.    Problem 2: ***   Comment: ***   Plan (include new prescriptions or refills): ***     Problem 3:  ***   Comment: ***   Plan (include new prescriptions or refills): ***     Pertinent Labs or imaging: ***       Medication options, alternatives (including no medications) and medication risks/benefits/side effects were discussed in detail.  Explained importance of contraceptive measures while on psychotropic medications, educated to let provider know if ever pregnant or wanting to become pregnant. Verbalized understanding.  The patient was advised to call, message provider on "[employer]"hart, or come in to the clinic if symptoms worsen or if any future questions/issues regarding their medications arise; the patient verbalized understanding and agreement.    The patient was educated to call 911, call the suicide hotline, or go to local ER if having thoughts of suicide or homicide; verbalized understanding.      Total time spent on the day of encounter: *** minutes. ***        No orders of the defined types were placed in this encounter.       Requested Prescriptions      No prescriptions requested or ordered in this encounter         Return to clinic in ***No follow-ups on file.  or sooner if symptoms worsen.  Next Appointment:   instruction provided on how to make the next appointment.     This patient's overall prognosis is {Good/Fair/Poor:39952}.     Patient’s ability to adhere to treatment plan is {Good/Fair/Poor:99277}.      Crisis Plan:  Calling clinic. Calling a 24-hour crisis hotline number [phone]. Calling 911. Utilizing the ED in the event of an emergency.      The proposed treatment plan was discussed with the patient who was provided the opportunity to ask questions and make suggestions regarding alternative treatment. Patient verbalized understanding and expressed agreement with the plan.     Thank you for allowing me to participate in the care of this patient.    Provider Signature: Lior Mckenna M.D. 6/30/2025             PAST MEDICAL / SURGICAL HISTORY, ALLERGIES, CURRENT MEDICATIONS        Past Medical History[2] Past Surgical History[3]     Allergies[4]      Current Medications[5]     Provider Signature: Lior Mckenna M.D. 6/30/2025      NOTE: ?Portions of this note were created using voice recognition software. ?Minor syntax errors, grammatical content or spelling, and punctuation errors may have occurred unintentionally. ?Please notify the author if changes are necessary or if the meaning of any statement is unclear.           [1] No Known Allergies  [2]   Past Medical History:  Diagnosis Date    Anxiety     Depression     Headache(784.0)     Hyperlipemia     Hypertension    [3] No past surgical history on file.  [4] No Known Allergies  [5]   Current Outpatient Medications   Medication Sig Dispense Refill    hydrOXYzine HCl (ATARAX) 25 MG Tab TAKE 1-2 TABLETS BY MOUTH AT BEDTIME AS NEEDED (SLEEP). 90 Tablet 2    Desvenlafaxine Succinate ER 25 MG TABLET SR 24 HR TAKE 1 TABLET BY MOUTH EVERY DAY 90 Tablet 3     No current facility-administered medications for this visit.      "04/18/2025 11:10 AM     (H) 04/18/2025 11:10 AM     Lab Results   Component Value Date/Time    SODIUM 137 04/18/2025 11:10 AM    POTASSIUM 4.8 04/18/2025 11:10 AM    CHLORIDE 102 04/18/2025 11:10 AM    CO2 26 04/18/2025 11:10 AM    ANION 9.0 04/18/2025 11:10 AM    GLUCOSE 89 04/18/2025 11:10 AM    BUN 11 04/18/2025 11:10 AM    CREATININE 1.02 04/18/2025 11:10 AM    CALCIUM 9.6 04/18/2025 11:10 AM    ASTSGOT 33 04/18/2025 11:10 AM    ALTSGPT 26 04/18/2025 11:10 AM    TBILIRUBIN 0.9 04/18/2025 11:10 AM    ALBUMIN 4.7 04/18/2025 11:10 AM    TOTPROTEIN 7.8 04/18/2025 11:10 AM    GLOBULIN 3.1 04/18/2025 11:10 AM    AGRATIO 1.5 04/18/2025 11:10 AM     Lab Results   Component Value Date/Time    WBC 3.5 (L) 04/18/2025 11:10 AM    RBC 5.49 04/18/2025 11:10 AM    HEMOGLOBIN 15.8 04/18/2025 11:10 AM    HEMATOCRIT 47.5 04/18/2025 11:10 AM    MCV 86.5 04/18/2025 11:10 AM    MCH 28.8 04/18/2025 11:10 AM    MCHC 33.3 04/18/2025 11:10 AM    RDW 42.2 04/18/2025 11:10 AM    PLATELETCT 221 04/18/2025 11:10 AM    MPV 9.7 04/18/2025 11:10 AM    NEUTSPOLYS 42.70 (L) 04/18/2025 11:10 AM    LYMPHOCYTES 47.10 (H) 04/18/2025 11:10 AM    MONOCYTES 8.40 04/18/2025 11:10 AM    EOSINOPHILS 1.20 04/18/2025 11:10 AM    BASOPHILS 0.60 04/18/2025 11:10 AM    IMMGRAN 0.00 04/18/2025 11:10 AM    NRBC 0.00 04/18/2025 11:10 AM    NEUTS 1.48 (L) 04/18/2025 11:10 AM    MONOS 0.29 04/18/2025 11:10 AM    EOS 0.04 04/18/2025 11:10 AM    BASO 0.02 04/18/2025 11:10 AM    IMMGRANAB 0.00 04/18/2025 11:10 AM    NRBCAB 0.00 04/18/2025 11:10 AM     Lab Results   Component Value Date/Time    HBA1C 6.3 (H) 04/18/2025 1110    AVGLUC 134 04/18/2025 1110     Lab Results   Component Value Date/Time    TSHULTRASEN 0.786 04/18/2025 1110     No results found for: \"FREET4\"  No results found for: \"25HYDROXY\"        SAFETY ASSESSMENT/RISK ASSESSMENTS      SAFETY ASSESSMENT - SELF:    Does patient acknowledge current or past symptoms of dangerousness to self? No "   History of suicide by family member: No   History of suicide by friend/significant other: No   Recent change in amount/specificity/intensity of suicidal thoughts or self-harm behavior? No   Current access to firearms, medications, or other identified means of suicide/self-harm? No   If yes, willing to restrict access to means of suicide/self-harm? Yes   Protective factors present:Druze Affiliation, Children in the home, Social Support, Sense of responsibility to family, Positive problem-solving skills, access to healthcare, willingness to seek help, no access to guns, African American, and Patient denies active suicidal ideations or plan     SAFETY ASSESSMENT - OTHERS:    Does patient acknowledge current or past symptoms of aggressive behavior or risk to others? No   Recent change in amount/specificity/intensity of thoughts or threats to harm others? No   Current access to firearms/other identified means of harm? No   If yes, willing to restrict access to weapons/means of harm? Yes      CURRENT RISK:  Though it is impossible to accurately predict with absolute certainty future events and human behaviors, an assessment of current risk factors and protective factors suggests that this patient's:       Suicidal: Low       Homicidal: Low       Self-Harm: Low       Relapse: Low       Crisis Safety Plan Reviewed Yes    Suicide Risk Assessment (Acute and Chronic):  Risk factors: Psychiatric Diagnosis, Severe or unremitting anxiety, Panic Attacks, and Male gender  Protective Factors: Druze Affiliation, Children in the home, Social Support, Sense of responsibility to family, Positive problem-solving skills, access to healthcare, willingness to seek help, no access to guns, African American, and Patient denies active suicidal ideations or plan  Though it is impossible to accurately predict with absolute certainty future events and human behaviors, an assessment of current suicidal indicators, risk factors, and  protective factors suggests that this patient's:  Acute Suicide Risk: low. -as stated above / increases with substance abuse.  Chronic Suicide Risk: low - as stated above / increases with substance abuse.         ASSESSMENT AND TREATMENT PLAN      DSM 5 Diagnosis:    NonPsychiatric Diagnosis:         SCREENINGS:      1/3/2023     5:00 PM 3/14/2025     7:20 AM 6/30/2025     2:00 PM   Depression Screen (PHQ-2/PHQ-9)   PHQ-2 Total Score 0 0 3   PHQ-9 Total Score   18     Interpretation of PHQ-9 Total Score   Score Severity   1-4 No Depression   5-9 Mild Depression   10-14 Moderate Depression   15-19 Moderately Severe Depression   20-27 Severe Depression         3/14/2025     7:34 AM 4/10/2025     7:21 AM 6/30/2025     4:28 PM   DIOGENES 7   DIOGENES-7 Total Score 14 12 19     Interpretation of DIOGENES 7 Total Score   Score Severity:  0-4 No Anxiety   5-9 Mild Anxiety  10-14 Moderate Anxiety  15-21 Severe Anxiety        Problem 1: DIOGENES; MDD, recurrent, moderate; OCD  Comment: Patient reporting from young age dealing with potential OCD symptoms in the context of having negative intrusive thoughts around security and safety as well as being whole, if things were not symmetrical.  Patient reporting repetitive ritualistic routines that he has developed over the period of time in the context of safety and to avoid negative intrusive thoughts in the context of closing his doors in the house a certain way and if he does not do this he will have to start from the beginning as well as locking his car at least 3 times.  Patient does report, that at times he feels that his OCD does benefit him in regards to his work, and his thoroughness in his work.  Patient does reporting separate mood and anxiety symptoms as well, in the context of his work dealing with irritability as well as at times anger.  Patient reporting previously being on Lexapro and Celexa however despite possible adequate trial patient had continued GI symptoms, most recently  patient had been placed on Pristiq by his PCP with a good response without GI symptoms, therefore discussed with patient here today considering titrating this up to more therapeutic dose to better target his mood and anxiety symptoms.  Plan (include new prescriptions or refills):   - Increase desvenlafaxine ER from 25 mg to 50 mg  -Start propranolol 10 mg 3 times daily as needed for acute anxiety and agitation; with plans to phase out patient previous use of benzodiazepines  -At the next appointment provided patient with education material to consider trazodone to help with the patient's sleep, additionally may consider Remeron  -provider recommended patient get established with a therapist/psychologist in order to strengthen and build coping skills to better process his mood and anxiety symptoms.  Provider additionally spent time educating the patient about specific therapy in regards to OCD in the form of exposure and response prevention therapy.  Provider additionally did discuss patient's medication regimen in the form of Pristiq and SNRI, discussed with patient, potentially considering the benefits associated with titrating up the patient's dose from 25 mg to 50 mg and patient was agreeable to this.  Provider additionally did discuss 2 as needed medications, 1 to help with his sleep in the form of trazodone, as well as a medication to be utilized as needed to help with the patient's acute anxiety or stress in the form of propranolol.  Provider did spend extensive time discussing controlled substances in the form of benzodiazepines, had issues associated with this medication in the form of tolerance , dependency, and potentially addiction.  Provider additionally discussed long-term use of benzodiazepines in length associated with neurocognitive disorder dementia.  Provider discussed safer alternatives such as propranolol and patient was agreeable to a trial of propranolol, to help with the patient's acute  anxiety, patient was agreeable to a trial of this.  Provider additionally extensively counseled the patient about biopsychosocial formulation, as well as good lifestyle modification in the form of regular exercise, healthy diet, as well as good sleep hygiene as well as attached CBT-I resources to the patient's AVS.  -Recommend updated labs including CMP, CBC, thyroid panel, Vit D, Vit B12, folate, thiamine; lipid panel, lithium level/Valproic acid level  --Discussed the risks, benefits, alternatives associated with medications, patient verbalized understanding and denied any further questions or concerns and was in agreement with the treatment plan.        Pertinent Labs or imaging: Reviewed most recent lab work from 4/18/2025, noted elevated hemoglobin A1c at 6.3%, as well as elevated lipid panel total cholesterol 214, ; liver function renal function within normal limits, TSH within normal limits       Medication options, alternatives (including no medications) and medication risks/benefits/side effects were discussed in detail.  Explained importance of contraceptive measures while on psychotropic medications, educated to let provider know if ever pregnant or wanting to become pregnant. Verbalized understanding.  The patient was advised to call, message provider on [facility], or come in to the clinic if symptoms worsen or if any future questions/issues regarding their medications arise; the patient verbalized understanding and agreement.    The patient was educated to call 911, call the suicide hotline, or go to local ER if having thoughts of suicide or homicide; verbalized understanding.      Total time spent on the day of encounter: 185 minutes.         Orders Placed This Encounter    desvenlafaxine succinate (PRISTIQ) 50 MG TABLET SR 24 HR    propranolol (INDERAL) 10 MG Tab        Requested Prescriptions     Signed Prescriptions Disp Refills    desvenlafaxine succinate (PRISTIQ) 50 MG TABLET SR 24 HR 30  Tablet 3     Sig: Take 1 Tablet by mouth every day.    propranolol (INDERAL) 10 MG Tab 90 Tablet 1     Sig: Take 1 Tablet by mouth 3 times a day as needed (for acute anxiety).         Return to clinic in [place] in about 5 weeks (around 8/4/2025).  or sooner if symptoms worsen.  Next Appointment:  instruction provided on how to make the next appointment.     This patient's overall prognosis is guarded.     Patient’s ability to adhere to treatment plan is guarded.      Crisis Plan:  Calling clinic. Calling a 24-hour crisis hotline number [phone]. Calling 911. Utilizing the ED in the event of an emergency.      The proposed treatment plan was discussed with the patient who was provided the opportunity to ask questions and make suggestions regarding alternative treatment. Patient verbalized understanding and expressed agreement with the plan.     Thank you for allowing me to participate in the care of this patient.    Provider Signature: Lior Mckenna M.D. 6/30/2025             PAST MEDICAL / SURGICAL HISTORY, ALLERGIES, CURRENT MEDICATIONS        Past Medical History[2] Past Surgical History[3]     Allergies[4]      Current Medications[5]     Provider Signature: Lior Mckenna M.D. 6/30/2025      NOTE: ?Portions of this note were created using voice recognition software. ?Minor syntax errors, grammatical content or spelling, and punctuation errors may have occurred unintentionally. ?Please notify the author if changes are necessary or if the meaning of any statement is unclear.           [1] No Known Allergies  [2]   Past Medical History:  Diagnosis Date    Anxiety     Depression     Headache(784.0)     Hyperlipemia     Hypertension    [3] History reviewed. No pertinent surgical history.  [4] No Known Allergies  [5]   Current Outpatient Medications   Medication Sig Dispense Refill    desvenlafaxine succinate (PRISTIQ) 50 MG TABLET SR 24 HR Take 1 Tablet by mouth every day. 30 Tablet 3    propranolol (INDERAL) 10 MG Tab Take  1 Tablet by mouth 3 times a day as needed (for acute anxiety). 90 Tablet 1    hydrOXYzine HCl (ATARAX) 25 MG Tab TAKE 1-2 TABLETS BY MOUTH AT BEDTIME AS NEEDED (SLEEP). 90 Tablet 2    Desvenlafaxine Succinate ER 25 MG TABLET SR 24 HR TAKE 1 TABLET BY MOUTH EVERY DAY 90 Tablet 3     No current facility-administered medications for this visit.

## 2025-07-10 ENCOUNTER — HOSPITAL ENCOUNTER (OUTPATIENT)
Facility: MEDICAL CENTER | Age: 40
End: 2025-07-10
Attending: FAMILY MEDICINE

## 2025-07-10 ENCOUNTER — OFFICE VISIT (OUTPATIENT)
Dept: MEDICAL GROUP | Facility: LAB | Age: 40
End: 2025-07-10
Payer: COMMERCIAL

## 2025-07-10 ENCOUNTER — HOSPITAL ENCOUNTER (OUTPATIENT)
Dept: LAB | Facility: MEDICAL CENTER | Age: 40
End: 2025-07-10
Attending: FAMILY MEDICINE

## 2025-07-10 VITALS
DIASTOLIC BLOOD PRESSURE: 76 MMHG | BODY MASS INDEX: 29.18 KG/M2 | WEIGHT: 197 LBS | OXYGEN SATURATION: 97 % | RESPIRATION RATE: 16 BRPM | HEART RATE: 84 BPM | HEIGHT: 69 IN | TEMPERATURE: 98.3 F | SYSTOLIC BLOOD PRESSURE: 118 MMHG

## 2025-07-10 DIAGNOSIS — R36.9 PENILE DISCHARGE: ICD-10-CM

## 2025-07-10 DIAGNOSIS — Z11.3 SCREEN FOR STD (SEXUALLY TRANSMITTED DISEASE): ICD-10-CM

## 2025-07-10 DIAGNOSIS — Z11.3 SCREEN FOR STD (SEXUALLY TRANSMITTED DISEASE): Primary | ICD-10-CM

## 2025-07-10 LAB
HCV AB SER QL: NORMAL
HIV 1+2 AB+HIV1 P24 AG SERPL QL IA: NORMAL
T PALLIDUM AB SER QL IA: NORMAL

## 2025-07-10 PROCEDURE — 87491 CHLMYD TRACH DNA AMP PROBE: CPT

## 2025-07-10 PROCEDURE — 86803 HEPATITIS C AB TEST: CPT

## 2025-07-10 PROCEDURE — 3074F SYST BP LT 130 MM HG: CPT | Performed by: FAMILY MEDICINE

## 2025-07-10 PROCEDURE — 86780 TREPONEMA PALLIDUM: CPT

## 2025-07-10 PROCEDURE — 87591 N.GONORRHOEAE DNA AMP PROB: CPT

## 2025-07-10 PROCEDURE — 81001 URINALYSIS AUTO W/SCOPE: CPT

## 2025-07-10 PROCEDURE — 3078F DIAST BP <80 MM HG: CPT | Performed by: FAMILY MEDICINE

## 2025-07-10 PROCEDURE — 99214 OFFICE O/P EST MOD 30 MIN: CPT | Performed by: FAMILY MEDICINE

## 2025-07-10 PROCEDURE — 87389 HIV-1 AG W/HIV-1&-2 AB AG IA: CPT

## 2025-07-10 PROCEDURE — 36415 COLL VENOUS BLD VENIPUNCTURE: CPT

## 2025-07-10 ASSESSMENT — FIBROSIS 4 INDEX: FIB4 SCORE: 1.11

## 2025-07-10 NOTE — PROGRESS NOTES
"Subjective:     CC: Penile discharge    HPI:   Lonnie presents today with concern for penile discharge that began about 1 week ago.  Reports white to yellow/green  discharge.  Denies associated symptoms including rash or dysuria.  Monogamous with female partner, no new partners.  Remote history of treated chlamydia.  No pelvic or perineal pain.  No urinary symptoms otherwise including dribbling, frequency, trouble starting/stopping.    Current Medications[1]    Medications, past medical history, allergies, and social history have been reviewed and updated.      Objective:       Exam:  /76   Pulse 84   Temp 36.8 °C (98.3 °F)   Resp 16   Ht 1.753 m (5' 9\")   Wt 89.4 kg (197 lb)   SpO2 97%   BMI 29.09 kg/m²  Body mass index is 29.09 kg/m².    Constitutional: Alert. Well appearing. No distress.  Skin: Warm, dry, good turgor, no visible rashes.  Respiratory: Normal effort.  Genitalia: normal circumcised penis, scrotal contents normal to inspection.  Small amount of thin clear/white discharge is expressed.  Psych: Answers questions appropriately. Normal affect and mood.      Assessment & Plan:     39 y.o. male with the following -     1. Penile discharge  Presents with 1 week of discharge without associated symptoms.  Check urinalysis and GC/CT screening as below.  - URINALYSIS,CULTURE IF INDICATED; Future  - Chlamydia/GC, PCR (Urine); Future    2. Screen for STD (sexually transmitted disease) (Primary)  - HIV AG/AB COMBO ASSAY SCREENING; Future  - HEP C VIRUS ANTIBODY; Future  - T.PALLIDUM AB KODY (SCREENING); Future      Please note that this note was created using voice recognition software.           [1]   Current Outpatient Medications   Medication Sig Dispense Refill    desvenlafaxine succinate (PRISTIQ) 50 MG TABLET SR 24 HR Take 1 Tablet by mouth every day. 30 Tablet 3    propranolol (INDERAL) 10 MG Tab Take 1 Tablet by mouth 3 times a day as needed (for acute anxiety). 90 Tablet 1    hydrOXYzine HCl " (ATARAX) 25 MG Tab TAKE 1-2 TABLETS BY MOUTH AT BEDTIME AS NEEDED (SLEEP). 90 Tablet 2    Desvenlafaxine Succinate ER 25 MG TABLET SR 24 HR TAKE 1 TABLET BY MOUTH EVERY DAY 90 Tablet 3     No current facility-administered medications for this visit.

## 2025-07-11 ENCOUNTER — RESULTS FOLLOW-UP (OUTPATIENT)
Dept: MEDICAL GROUP | Facility: LAB | Age: 40
End: 2025-07-11

## 2025-07-11 ENCOUNTER — APPOINTMENT (OUTPATIENT)
Dept: MEDICAL GROUP | Facility: LAB | Age: 40
End: 2025-07-11
Payer: COMMERCIAL

## 2025-07-11 LAB
APPEARANCE UR: CLEAR
BACTERIA #/AREA URNS HPF: NORMAL /HPF
BILIRUB UR QL STRIP.AUTO: NEGATIVE
C TRACH DNA SPEC QL NAA+PROBE: NEGATIVE
CASTS URNS QL MICRO: NORMAL /LPF (ref 0–2)
COLOR UR: YELLOW
EPITHELIAL CELLS 1715: NORMAL /HPF (ref 0–5)
GLUCOSE UR STRIP.AUTO-MCNC: NEGATIVE MG/DL
KETONES UR STRIP.AUTO-MCNC: NEGATIVE MG/DL
LEUKOCYTE ESTERASE UR QL STRIP.AUTO: ABNORMAL
MICRO URNS: ABNORMAL
N GONORRHOEA DNA SPEC QL NAA+PROBE: NEGATIVE
NITRITE UR QL STRIP.AUTO: NEGATIVE
PH UR STRIP.AUTO: 7 [PH] (ref 5–8)
PROT UR QL STRIP: NEGATIVE MG/DL
RBC # URNS HPF: NORMAL /HPF (ref 0–2)
RBC UR QL AUTO: NEGATIVE
SP GR UR STRIP.AUTO: 1.01
SPECIMEN SOURCE: NORMAL
UROBILINOGEN UR STRIP.AUTO-MCNC: 0.2 EU/DL
WBC #/AREA URNS HPF: NORMAL /HPF

## 2025-08-08 ENCOUNTER — APPOINTMENT (OUTPATIENT)
Dept: BEHAVIORAL HEALTH | Facility: CLINIC | Age: 40
End: 2025-08-08